# Patient Record
Sex: MALE | Race: BLACK OR AFRICAN AMERICAN | NOT HISPANIC OR LATINO | URBAN - METROPOLITAN AREA
[De-identification: names, ages, dates, MRNs, and addresses within clinical notes are randomized per-mention and may not be internally consistent; named-entity substitution may affect disease eponyms.]

---

## 2017-03-03 ENCOUNTER — OUTPATIENT (OUTPATIENT)
Dept: OUTPATIENT SERVICES | Facility: HOSPITAL | Age: 67
LOS: 1 days | Discharge: HOME | End: 2017-03-03

## 2017-05-01 PROBLEM — Z00.00 ENCOUNTER FOR PREVENTIVE HEALTH EXAMINATION: Status: ACTIVE | Noted: 2017-05-01

## 2017-05-16 ENCOUNTER — APPOINTMENT (OUTPATIENT)
Dept: VASCULAR SURGERY | Facility: CLINIC | Age: 67
End: 2017-05-16

## 2017-06-27 DIAGNOSIS — R05 COUGH: ICD-10-CM

## 2017-09-12 ENCOUNTER — APPOINTMENT (OUTPATIENT)
Dept: VASCULAR SURGERY | Facility: CLINIC | Age: 67
End: 2017-09-12
Payer: MEDICARE

## 2017-09-12 VITALS
DIASTOLIC BLOOD PRESSURE: 92 MMHG | HEIGHT: 70 IN | BODY MASS INDEX: 25.77 KG/M2 | SYSTOLIC BLOOD PRESSURE: 126 MMHG | WEIGHT: 180 LBS

## 2017-09-12 DIAGNOSIS — Z86.39 PERSONAL HISTORY OF OTHER ENDOCRINE, NUTRITIONAL AND METABOLIC DISEASE: ICD-10-CM

## 2017-09-12 DIAGNOSIS — Z86.79 PERSONAL HISTORY OF OTHER DISEASES OF THE CIRCULATORY SYSTEM: ICD-10-CM

## 2017-09-12 DIAGNOSIS — Z86.59 PERSONAL HISTORY OF OTHER MENTAL AND BEHAVIORAL DISORDERS: ICD-10-CM

## 2017-09-12 PROCEDURE — 99212 OFFICE O/P EST SF 10 MIN: CPT

## 2017-11-16 ENCOUNTER — APPOINTMENT (OUTPATIENT)
Dept: UROLOGY | Facility: CLINIC | Age: 67
End: 2017-11-16

## 2017-11-16 ENCOUNTER — OTHER (OUTPATIENT)
Age: 67
End: 2017-11-16

## 2017-11-28 ENCOUNTER — APPOINTMENT (OUTPATIENT)
Dept: VASCULAR SURGERY | Facility: CLINIC | Age: 67
End: 2017-11-28

## 2017-11-28 ENCOUNTER — OUTPATIENT (OUTPATIENT)
Dept: OUTPATIENT SERVICES | Facility: HOSPITAL | Age: 67
LOS: 1 days | Discharge: HOME | End: 2017-11-28

## 2017-11-28 DIAGNOSIS — I71.00 DISSECTION OF UNSPECIFIED SITE OF AORTA: ICD-10-CM

## 2017-12-08 ENCOUNTER — OUTPATIENT (OUTPATIENT)
Dept: OUTPATIENT SERVICES | Facility: HOSPITAL | Age: 67
LOS: 1 days | Discharge: HOME | End: 2017-12-08

## 2017-12-08 DIAGNOSIS — I71.2 THORACIC AORTIC ANEURYSM, WITHOUT RUPTURE: ICD-10-CM

## 2017-12-19 ENCOUNTER — APPOINTMENT (OUTPATIENT)
Dept: VASCULAR SURGERY | Facility: CLINIC | Age: 67
End: 2017-12-19
Payer: MEDICARE

## 2017-12-19 DIAGNOSIS — I71.00 DISSECTION OF UNSPECIFIED SITE OF AORTA: ICD-10-CM

## 2017-12-19 PROCEDURE — 99213 OFFICE O/P EST LOW 20 MIN: CPT

## 2018-01-11 LAB
BUN SERPL-MCNC: 17 MG/DL
CREAT SERPL-MCNC: 1.26 MG/DL
GFR SERPL CREATININE-BSD FRML MDRD: 70

## 2018-04-17 ENCOUNTER — RESULT REVIEW (OUTPATIENT)
Age: 68
End: 2018-04-17

## 2018-04-17 ENCOUNTER — OUTPATIENT (OUTPATIENT)
Dept: OUTPATIENT SERVICES | Facility: HOSPITAL | Age: 68
LOS: 1 days | Discharge: HOME | End: 2018-04-17

## 2018-04-17 VITALS — SYSTOLIC BLOOD PRESSURE: 141 MMHG | RESPIRATION RATE: 18 BRPM | DIASTOLIC BLOOD PRESSURE: 88 MMHG | HEART RATE: 60 BPM

## 2018-04-17 VITALS
DIASTOLIC BLOOD PRESSURE: 100 MMHG | SYSTOLIC BLOOD PRESSURE: 176 MMHG | TEMPERATURE: 98 F | HEART RATE: 66 BPM | WEIGHT: 179.9 LBS | RESPIRATION RATE: 17 BRPM | HEIGHT: 70 IN

## 2018-04-17 RX ORDER — LOSARTAN POTASSIUM 100 MG/1
1 TABLET, FILM COATED ORAL
Qty: 0 | Refills: 0 | COMMUNITY

## 2018-04-17 RX ORDER — NIFEDIPINE 30 MG
1 TABLET, EXTENDED RELEASE 24 HR ORAL
Qty: 0 | Refills: 0 | COMMUNITY

## 2018-04-17 RX ORDER — METOPROLOL TARTRATE 50 MG
1 TABLET ORAL
Qty: 0 | Refills: 0 | COMMUNITY

## 2018-04-17 NOTE — H&P ADULT - NSHPPHYSICALEXAM_GEN_ALL_CORE
GENERAL:  Appears stated age, well-groomed, well-nourished, no distress  HEENT:  NC/AT,  conjunctivae clear and pink,  CHEST:  Full & symmetric excursion, no increased effort, breath sounds clear  HEART:  Regular rhythm, S1, S2, no murmur/rub/S3/S4, no abdominal bruit, no edema  ABDOMEN:  Soft, non-tender, non-distended,   EXTREMITIES:  no cyanosis, clubbing or edema    NEURO:  Alert, oriented, no asterixis, no tremor, no encephalopathy

## 2018-04-17 NOTE — H&P ADULT - ASSESSMENT
68 year old male with history of HTN and thoracic aortic aneurysm s/p repair here for GERD for EGD and screening colonoscopy.

## 2018-04-19 DIAGNOSIS — K29.50 UNSPECIFIED CHRONIC GASTRITIS WITHOUT BLEEDING: ICD-10-CM

## 2018-04-19 DIAGNOSIS — R15.0 INCOMPLETE DEFECATION: ICD-10-CM

## 2018-04-19 DIAGNOSIS — K31.9 DISEASE OF STOMACH AND DUODENUM, UNSPECIFIED: ICD-10-CM

## 2018-04-19 DIAGNOSIS — Z53.09 PROCEDURE AND TREATMENT NOT CARRIED OUT BECAUSE OF OTHER CONTRAINDICATION: ICD-10-CM

## 2018-04-19 DIAGNOSIS — I10 ESSENTIAL (PRIMARY) HYPERTENSION: ICD-10-CM

## 2018-04-19 DIAGNOSIS — K21.9 GASTRO-ESOPHAGEAL REFLUX DISEASE WITHOUT ESOPHAGITIS: ICD-10-CM

## 2018-04-19 DIAGNOSIS — K44.9 DIAPHRAGMATIC HERNIA WITHOUT OBSTRUCTION OR GANGRENE: ICD-10-CM

## 2018-04-19 LAB — SURGICAL PATHOLOGY STUDY: SIGNIFICANT CHANGE UP

## 2019-01-01 ENCOUNTER — APPOINTMENT (OUTPATIENT)
Dept: UROLOGY | Facility: CLINIC | Age: 69
End: 2019-01-01
Payer: MEDICARE

## 2019-01-01 DIAGNOSIS — N52.01 ERECTILE DYSFUNCTION DUE TO ARTERIAL INSUFFICIENCY: ICD-10-CM

## 2019-01-01 DIAGNOSIS — C61 MALIGNANT NEOPLASM OF PROSTATE: ICD-10-CM

## 2019-01-01 PROCEDURE — 99204 OFFICE O/P NEW MOD 45 MIN: CPT

## 2019-09-18 PROBLEM — I10 ESSENTIAL (PRIMARY) HYPERTENSION: Chronic | Status: ACTIVE | Noted: 2018-04-17

## 2019-10-30 PROBLEM — C61 PROSTATE CANCER: Status: ACTIVE | Noted: 2019-01-01

## 2019-10-30 PROBLEM — N52.01 ERECTILE DYSFUNCTION DUE TO ARTERIAL INSUFFICIENCY: Status: ACTIVE | Noted: 2019-01-01

## 2019-11-12 NOTE — HISTORY OF PRESENT ILLNESS
[FreeTextEntry1] : This is a 69 year male who was treated for prostate cancer with radiation in 2010. has been doing well since\par PSA was done two months ago and showed result of 0.2 -- medical records from [facility] reviewed by me\par no leakage of urine, and urination is normal\par states has problems with erections since that time\par sex drive remains normal\par \par He has done research into treatments for erectile dysfuction.  we discussed oral med treatments such as cialis and viagra, penile injections, vacuum device, MUSE, and inflatable penile prosthesis.\par He tells me that he is ready to proceed with implantation of inflatable penile prosthesis. Just needs to clear up medical insurance concerns\par \par

## 2019-11-12 NOTE — PHYSICAL EXAM
[General Appearance - Well Developed] : well developed [Normal Appearance] : normal appearance [General Appearance - Well Nourished] : well nourished [General Appearance - In No Acute Distress] : no acute distress [Well Groomed] : well groomed [Edema] : no peripheral edema [Respiration, Rhythm And Depth] : normal respiratory rhythm and effort [Exaggerated Use Of Accessory Muscles For Inspiration] : no accessory muscle use [Abdomen Soft] : soft [Abdomen Tenderness] : non-tender [Costovertebral Angle Tenderness] : no ~M costovertebral angle tenderness [Normal Station and Gait] : the gait and station were normal for the patient's age [] : no rash [No Focal Deficits] : no focal deficits [Oriented To Time, Place, And Person] : oriented to person, place, and time [Affect] : the affect was normal [Not Anxious] : not anxious [Mood] : the mood was normal

## 2019-11-12 NOTE — ASSESSMENT
[FreeTextEntry1] : This is a 69 year male who was treated for prostate cancer with radiation in 2010. has been doing well since\par PSA was done two months ago and showed result of 0.2 -- medical records from [facility] reviewed by me\par no leakage of urine, and urination is normal\par states has problems with erections since that time\par sex drive remains normal\par \par He has done research into treatments for erectile dysfuction.  we discussed oral med treatments such as cialis and viagra, penile injections, vacuum device, MUSE, and inflatable penile prosthesis.\par He tells me that he is ready to proceed with implantation of inflatable penile prosthesis. Just needs to clear up medical insurance concerns

## 2020-01-01 ENCOUNTER — EMERGENCY (EMERGENCY)
Facility: HOSPITAL | Age: 70
LOS: 0 days | Discharge: HOME | End: 2020-07-29
Attending: EMERGENCY MEDICINE | Admitting: EMERGENCY MEDICINE
Payer: MEDICARE

## 2020-01-01 ENCOUNTER — APPOINTMENT (OUTPATIENT)
Dept: CARDIOTHORACIC SURGERY | Facility: HOSPITAL | Age: 70
End: 2020-01-01

## 2020-01-01 ENCOUNTER — TRANSCRIPTION ENCOUNTER (OUTPATIENT)
Age: 70
End: 2020-01-01

## 2020-01-01 ENCOUNTER — RESULT REVIEW (OUTPATIENT)
Age: 70
End: 2020-01-01

## 2020-01-01 ENCOUNTER — APPOINTMENT (OUTPATIENT)
Dept: CARDIOTHORACIC SURGERY | Facility: CLINIC | Age: 70
End: 2020-01-01
Payer: MEDICARE

## 2020-01-01 ENCOUNTER — INPATIENT (INPATIENT)
Facility: HOSPITAL | Age: 70
LOS: 4 days | DRG: 3 | End: 2020-08-05
Attending: THORACIC SURGERY (CARDIOTHORACIC VASCULAR SURGERY) | Admitting: THORACIC SURGERY (CARDIOTHORACIC VASCULAR SURGERY)
Payer: MEDICARE

## 2020-01-01 VITALS — DIASTOLIC BLOOD PRESSURE: 120 MMHG | HEART RATE: 75 BPM | SYSTOLIC BLOOD PRESSURE: 210 MMHG

## 2020-01-01 VITALS
WEIGHT: 179.9 LBS | HEART RATE: 65 BPM | OXYGEN SATURATION: 98 % | TEMPERATURE: 98 F | RESPIRATION RATE: 19 BRPM | SYSTOLIC BLOOD PRESSURE: 157 MMHG | DIASTOLIC BLOOD PRESSURE: 74 MMHG

## 2020-01-01 VITALS
WEIGHT: 180 LBS | RESPIRATION RATE: 16 BRPM | BODY MASS INDEX: 25.77 KG/M2 | DIASTOLIC BLOOD PRESSURE: 94 MMHG | HEIGHT: 70 IN | OXYGEN SATURATION: 98 % | HEART RATE: 66 BPM | TEMPERATURE: 98.6 F | SYSTOLIC BLOOD PRESSURE: 174 MMHG

## 2020-01-01 VITALS
SYSTOLIC BLOOD PRESSURE: 191 MMHG | HEIGHT: 70 IN | TEMPERATURE: 98 F | WEIGHT: 169.76 LBS | HEART RATE: 67 BPM | DIASTOLIC BLOOD PRESSURE: 100 MMHG | OXYGEN SATURATION: 97 % | RESPIRATION RATE: 18 BRPM

## 2020-01-01 VITALS — WEIGHT: 165.35 LBS | OXYGEN SATURATION: 69 %

## 2020-01-01 DIAGNOSIS — R10.9 UNSPECIFIED ABDOMINAL PAIN: ICD-10-CM

## 2020-01-01 DIAGNOSIS — I71.5: ICD-10-CM

## 2020-01-01 DIAGNOSIS — Z87.891 PERSONAL HISTORY OF NICOTINE DEPENDENCE: ICD-10-CM

## 2020-01-01 DIAGNOSIS — I71.4 ABDOMINAL AORTIC ANEURYSM, WITHOUT RUPTURE: ICD-10-CM

## 2020-01-01 DIAGNOSIS — I71.6 THORACOABDOMINAL AORTIC ANEURYSM, W/OUT RUPTURE: ICD-10-CM

## 2020-01-01 DIAGNOSIS — I71.2 THORACIC AORTIC ANEURYSM, W/OUT RUPTURE: ICD-10-CM

## 2020-01-01 DIAGNOSIS — Z86.79 PERSONAL HISTORY OF OTHER DISEASES OF THE CIRCULATORY SYSTEM: Chronic | ICD-10-CM

## 2020-01-01 DIAGNOSIS — I10 ESSENTIAL (PRIMARY) HYPERTENSION: ICD-10-CM

## 2020-01-01 LAB
A1C WITH ESTIMATED AVERAGE GLUCOSE RESULT: 6 % — HIGH (ref 4–5.6)
ALBUMIN SERPL ELPH-MCNC: 1.3 G/DL — LOW (ref 3.3–5)
ALBUMIN SERPL ELPH-MCNC: 1.5 G/DL — LOW (ref 3.3–5)
ALBUMIN SERPL ELPH-MCNC: 1.6 G/DL — LOW (ref 3.3–5)
ALBUMIN SERPL ELPH-MCNC: 1.7 G/DL — LOW (ref 3.3–5)
ALBUMIN SERPL ELPH-MCNC: 1.8 G/DL — LOW (ref 3.3–5)
ALBUMIN SERPL ELPH-MCNC: 1.9 G/DL — LOW (ref 3.3–5)
ALBUMIN SERPL ELPH-MCNC: 2 G/DL — LOW (ref 3.3–5)
ALBUMIN SERPL ELPH-MCNC: 2.1 G/DL — LOW (ref 3.3–5)
ALBUMIN SERPL ELPH-MCNC: 2.1 G/DL — LOW (ref 3.3–5)
ALBUMIN SERPL ELPH-MCNC: 2.2 G/DL — LOW (ref 3.3–5)
ALBUMIN SERPL ELPH-MCNC: 2.2 G/DL — LOW (ref 3.3–5)
ALBUMIN SERPL ELPH-MCNC: 2.3 G/DL — LOW (ref 3.3–5)
ALBUMIN SERPL ELPH-MCNC: 2.8 G/DL — LOW (ref 3.3–5)
ALBUMIN SERPL ELPH-MCNC: 2.8 G/DL — LOW (ref 3.3–5)
ALBUMIN SERPL ELPH-MCNC: 3.3 G/DL — SIGNIFICANT CHANGE UP (ref 3.3–5)
ALBUMIN SERPL ELPH-MCNC: 3.4 G/DL — SIGNIFICANT CHANGE UP (ref 3.3–5)
ALBUMIN SERPL ELPH-MCNC: 3.7 G/DL — SIGNIFICANT CHANGE UP (ref 3.3–5)
ALBUMIN SERPL ELPH-MCNC: 4.5 G/DL — SIGNIFICANT CHANGE UP (ref 3.3–5)
ALBUMIN SERPL ELPH-MCNC: 4.9 G/DL — SIGNIFICANT CHANGE UP (ref 3.5–5.2)
ALP SERPL-CCNC: 114 U/L — SIGNIFICANT CHANGE UP (ref 40–120)
ALP SERPL-CCNC: 136 U/L — HIGH (ref 30–115)
ALP SERPL-CCNC: 29 U/L — LOW (ref 40–120)
ALP SERPL-CCNC: 29 U/L — LOW (ref 40–120)
ALP SERPL-CCNC: 44 U/L — SIGNIFICANT CHANGE UP (ref 40–120)
ALP SERPL-CCNC: 49 U/L — SIGNIFICANT CHANGE UP (ref 40–120)
ALP SERPL-CCNC: 50 U/L — SIGNIFICANT CHANGE UP (ref 40–120)
ALP SERPL-CCNC: 52 U/L — SIGNIFICANT CHANGE UP (ref 40–120)
ALP SERPL-CCNC: 53 U/L — SIGNIFICANT CHANGE UP (ref 40–120)
ALP SERPL-CCNC: 57 U/L — SIGNIFICANT CHANGE UP (ref 40–120)
ALP SERPL-CCNC: 59 U/L — SIGNIFICANT CHANGE UP (ref 40–120)
ALP SERPL-CCNC: 60 U/L — SIGNIFICANT CHANGE UP (ref 40–120)
ALP SERPL-CCNC: 61 U/L — SIGNIFICANT CHANGE UP (ref 40–120)
ALP SERPL-CCNC: 62 U/L — SIGNIFICANT CHANGE UP (ref 40–120)
ALP SERPL-CCNC: 68 U/L — SIGNIFICANT CHANGE UP (ref 40–120)
ALP SERPL-CCNC: 72 U/L — SIGNIFICANT CHANGE UP (ref 40–120)
ALP SERPL-CCNC: 82 U/L — SIGNIFICANT CHANGE UP (ref 40–120)
ALP SERPL-CCNC: 89 U/L — SIGNIFICANT CHANGE UP (ref 40–120)
ALP SERPL-CCNC: 89 U/L — SIGNIFICANT CHANGE UP (ref 40–120)
ALT FLD-CCNC: 10 U/L — SIGNIFICANT CHANGE UP (ref 10–45)
ALT FLD-CCNC: 12 U/L — SIGNIFICANT CHANGE UP (ref 0–41)
ALT FLD-CCNC: 201 U/L — HIGH (ref 10–45)
ALT FLD-CCNC: 202 U/L — HIGH (ref 10–45)
ALT FLD-CCNC: 213 U/L — HIGH (ref 10–45)
ALT FLD-CCNC: 282 U/L — HIGH (ref 10–45)
ALT FLD-CCNC: 349 U/L — HIGH (ref 10–45)
ALT FLD-CCNC: 430 U/L — HIGH (ref 10–45)
ALT FLD-CCNC: 528 U/L — HIGH (ref 10–45)
ALT FLD-CCNC: 551 U/L — HIGH (ref 10–45)
ALT FLD-CCNC: 564 U/L — HIGH (ref 10–45)
ALT FLD-CCNC: 584 U/L — HIGH (ref 10–45)
ALT FLD-CCNC: 7 U/L — LOW (ref 10–45)
ALT FLD-CCNC: 7 U/L — LOW (ref 10–45)
ALT FLD-CCNC: 752 U/L — HIGH (ref 10–45)
ALT FLD-CCNC: 775 U/L — HIGH (ref 10–45)
ALT FLD-CCNC: 8 U/L — LOW (ref 10–45)
ALT FLD-CCNC: 897 U/L — HIGH (ref 10–45)
ALT FLD-CCNC: SIGNIFICANT CHANGE UP U/L (ref 10–45)
ANION GAP SERPL CALC-SCNC: 10 MMOL/L — SIGNIFICANT CHANGE UP (ref 5–17)
ANION GAP SERPL CALC-SCNC: 11 MMOL/L — SIGNIFICANT CHANGE UP (ref 5–17)
ANION GAP SERPL CALC-SCNC: 13 MMOL/L — SIGNIFICANT CHANGE UP (ref 5–17)
ANION GAP SERPL CALC-SCNC: 13 MMOL/L — SIGNIFICANT CHANGE UP (ref 5–17)
ANION GAP SERPL CALC-SCNC: 14 MMOL/L — SIGNIFICANT CHANGE UP (ref 5–17)
ANION GAP SERPL CALC-SCNC: 17 MMOL/L — SIGNIFICANT CHANGE UP (ref 5–17)
ANION GAP SERPL CALC-SCNC: 18 MMOL/L — HIGH (ref 5–17)
ANION GAP SERPL CALC-SCNC: 18 MMOL/L — HIGH (ref 7–14)
ANION GAP SERPL CALC-SCNC: 19 MMOL/L — HIGH (ref 5–17)
ANION GAP SERPL CALC-SCNC: 20 MMOL/L — HIGH (ref 5–17)
ANION GAP SERPL CALC-SCNC: 20 MMOL/L — HIGH (ref 5–17)
ANION GAP SERPL CALC-SCNC: 21 MMOL/L — HIGH (ref 5–17)
ANION GAP SERPL CALC-SCNC: 22 MMOL/L — HIGH (ref 5–17)
ANION GAP SERPL CALC-SCNC: 23 MMOL/L — HIGH (ref 5–17)
ANION GAP SERPL CALC-SCNC: 24 MMOL/L — HIGH (ref 5–17)
ANION GAP SERPL CALC-SCNC: 24 MMOL/L — HIGH (ref 5–17)
ANION GAP SERPL CALC-SCNC: 25 MMOL/L — HIGH (ref 5–17)
ANION GAP SERPL CALC-SCNC: 8 MMOL/L — SIGNIFICANT CHANGE UP (ref 5–17)
ANION GAP SERPL CALC-SCNC: 9 MMOL/L — SIGNIFICANT CHANGE UP (ref 5–17)
APTT BLD: 196.3 SEC — CRITICAL HIGH (ref 27.5–35.5)
APTT BLD: 30.5 SEC — SIGNIFICANT CHANGE UP (ref 27.5–35.5)
APTT BLD: 30.8 SEC — SIGNIFICANT CHANGE UP (ref 27.5–35.5)
APTT BLD: 31.2 SEC — SIGNIFICANT CHANGE UP (ref 27.5–35.5)
APTT BLD: 32.1 SEC — SIGNIFICANT CHANGE UP (ref 27.5–35.5)
APTT BLD: 32.2 SEC — SIGNIFICANT CHANGE UP (ref 27.5–35.5)
APTT BLD: 33.2 SEC — SIGNIFICANT CHANGE UP (ref 27.5–35.5)
APTT BLD: 34.9 SEC — SIGNIFICANT CHANGE UP (ref 27.5–35.5)
APTT BLD: 34.9 SEC — SIGNIFICANT CHANGE UP (ref 27.5–35.5)
APTT BLD: 35.2 SEC — SIGNIFICANT CHANGE UP (ref 27.5–35.5)
APTT BLD: 36 SEC — HIGH (ref 27.5–35.5)
APTT BLD: 36.6 SEC — HIGH (ref 27.5–35.5)
APTT BLD: 37.6 SEC — HIGH (ref 27.5–35.5)
APTT BLD: 38.7 SEC — HIGH (ref 27.5–35.5)
APTT BLD: 39.8 SEC — HIGH (ref 27–39.2)
APTT BLD: 40.6 SEC — HIGH (ref 27.5–35.5)
APTT BLD: 41.3 SEC — HIGH (ref 27.5–35.5)
APTT BLD: 41.5 SEC — HIGH (ref 27.5–35.5)
APTT BLD: 43.6 SEC — HIGH (ref 27.5–35.5)
APTT BLD: 49.3 SEC — HIGH (ref 27.5–35.5)
APTT BLD: 54.7 SEC — HIGH (ref 27.5–35.5)
APTT BLD: 77.7 SEC — HIGH (ref 27.5–35.5)
APTT BLD: 84.4 SEC — HIGH (ref 27.5–35.5)
AST SERPL-CCNC: 10 U/L — SIGNIFICANT CHANGE UP (ref 10–40)
AST SERPL-CCNC: 10 U/L — SIGNIFICANT CHANGE UP (ref 10–40)
AST SERPL-CCNC: 1029 U/L — HIGH (ref 10–40)
AST SERPL-CCNC: 1044 U/L — HIGH (ref 10–40)
AST SERPL-CCNC: 1147 U/L — HIGH (ref 10–40)
AST SERPL-CCNC: 12 U/L — SIGNIFICANT CHANGE UP (ref 10–40)
AST SERPL-CCNC: 1217 U/L — HIGH (ref 10–40)
AST SERPL-CCNC: 1318 U/L — HIGH (ref 10–40)
AST SERPL-CCNC: 14 U/L — SIGNIFICANT CHANGE UP (ref 10–40)
AST SERPL-CCNC: 1470 U/L — HIGH (ref 10–40)
AST SERPL-CCNC: 1476 U/L — HIGH (ref 10–40)
AST SERPL-CCNC: 1698 U/L — HIGH (ref 10–40)
AST SERPL-CCNC: 17 U/L — SIGNIFICANT CHANGE UP (ref 0–41)
AST SERPL-CCNC: 1771 U/L — HIGH (ref 10–40)
AST SERPL-CCNC: 1973 U/L — HIGH (ref 10–40)
AST SERPL-CCNC: 2105 U/L — HIGH (ref 10–40)
AST SERPL-CCNC: 521 U/L — HIGH (ref 10–40)
AST SERPL-CCNC: 734 U/L — HIGH (ref 10–40)
AST SERPL-CCNC: SIGNIFICANT CHANGE UP U/L (ref 10–40)
BASE EXCESS BLDA CALC-SCNC: 0.2 MMOL/L — SIGNIFICANT CHANGE UP (ref -2–3)
BASE EXCESS BLDMV CALC-SCNC: -4.7 MMOL/L — SIGNIFICANT CHANGE UP
BASOPHILS # BLD AUTO: 0 K/UL — SIGNIFICANT CHANGE UP (ref 0–0.2)
BASOPHILS # BLD AUTO: 0.01 K/UL — SIGNIFICANT CHANGE UP (ref 0–0.2)
BASOPHILS # BLD AUTO: 0.01 K/UL — SIGNIFICANT CHANGE UP (ref 0–0.2)
BASOPHILS # BLD AUTO: 0.02 K/UL — SIGNIFICANT CHANGE UP (ref 0–0.2)
BASOPHILS # BLD AUTO: 0.02 K/UL — SIGNIFICANT CHANGE UP (ref 0–0.2)
BASOPHILS # BLD AUTO: 0.03 K/UL — SIGNIFICANT CHANGE UP (ref 0–0.2)
BASOPHILS NFR BLD AUTO: 0 % — SIGNIFICANT CHANGE UP (ref 0–2)
BASOPHILS NFR BLD AUTO: 0.2 % — SIGNIFICANT CHANGE UP (ref 0–1)
BASOPHILS NFR BLD AUTO: 0.3 % — SIGNIFICANT CHANGE UP (ref 0–2)
BASOPHILS NFR BLD AUTO: 0.3 % — SIGNIFICANT CHANGE UP (ref 0–2)
BASOPHILS NFR BLD AUTO: 0.5 % — SIGNIFICANT CHANGE UP (ref 0–2)
BASOPHILS NFR BLD AUTO: 0.6 % — SIGNIFICANT CHANGE UP (ref 0–2)
BILIRUB SERPL-MCNC: 0.5 MG/DL — SIGNIFICANT CHANGE UP (ref 0.2–1.2)
BILIRUB SERPL-MCNC: 0.8 MG/DL — SIGNIFICANT CHANGE UP (ref 0.2–1.2)
BILIRUB SERPL-MCNC: 1 MG/DL — SIGNIFICANT CHANGE UP (ref 0.2–1.2)
BILIRUB SERPL-MCNC: 1 MG/DL — SIGNIFICANT CHANGE UP (ref 0.2–1.2)
BILIRUB SERPL-MCNC: 1.1 MG/DL — SIGNIFICANT CHANGE UP (ref 0.2–1.2)
BILIRUB SERPL-MCNC: 1.2 MG/DL — SIGNIFICANT CHANGE UP (ref 0.2–1.2)
BILIRUB SERPL-MCNC: 1.3 MG/DL — HIGH (ref 0.2–1.2)
BILIRUB SERPL-MCNC: 1.4 MG/DL — HIGH (ref 0.2–1.2)
BILIRUB SERPL-MCNC: 1.5 MG/DL — HIGH (ref 0.2–1.2)
BILIRUB SERPL-MCNC: 1.8 MG/DL — HIGH (ref 0.2–1.2)
BILIRUB SERPL-MCNC: 1.9 MG/DL — HIGH (ref 0.2–1.2)
BILIRUB SERPL-MCNC: 2 MG/DL — HIGH (ref 0.2–1.2)
BILIRUB SERPL-MCNC: 2.2 MG/DL — HIGH (ref 0.2–1.2)
BILIRUB SERPL-MCNC: 2.3 MG/DL — HIGH (ref 0.2–1.2)
BILIRUB SERPL-MCNC: 2.6 MG/DL — HIGH (ref 0.2–1.2)
BLD GP AB SCN SERPL QL: NEGATIVE — SIGNIFICANT CHANGE UP
BLD GP AB SCN SERPL QL: SIGNIFICANT CHANGE UP
BUN SERPL-MCNC: 10 MG/DL — SIGNIFICANT CHANGE UP (ref 10–20)
BUN SERPL-MCNC: 13 MG/DL — SIGNIFICANT CHANGE UP (ref 7–23)
BUN SERPL-MCNC: 15 MG/DL — SIGNIFICANT CHANGE UP (ref 7–23)
BUN SERPL-MCNC: 16 MG/DL — SIGNIFICANT CHANGE UP (ref 7–23)
BUN SERPL-MCNC: 16 MG/DL — SIGNIFICANT CHANGE UP (ref 7–23)
BUN SERPL-MCNC: 17 MG/DL — SIGNIFICANT CHANGE UP (ref 7–23)
BUN SERPL-MCNC: 18 MG/DL — SIGNIFICANT CHANGE UP (ref 7–23)
BUN SERPL-MCNC: 23 MG/DL — SIGNIFICANT CHANGE UP (ref 7–23)
BUN SERPL-MCNC: 24 MG/DL — HIGH (ref 7–23)
BUN SERPL-MCNC: 24 MG/DL — HIGH (ref 7–23)
BUN SERPL-MCNC: 30 MG/DL — HIGH (ref 7–23)
CALCIUM SERPL-MCNC: 10 MG/DL — SIGNIFICANT CHANGE UP (ref 8.4–10.5)
CALCIUM SERPL-MCNC: 10.1 MG/DL — SIGNIFICANT CHANGE UP (ref 8.5–10.1)
CALCIUM SERPL-MCNC: 10.2 MG/DL — SIGNIFICANT CHANGE UP (ref 8.4–10.5)
CALCIUM SERPL-MCNC: 11.4 MG/DL — HIGH (ref 8.4–10.5)
CALCIUM SERPL-MCNC: 12.3 MG/DL — HIGH (ref 8.4–10.5)
CALCIUM SERPL-MCNC: 7.6 MG/DL — LOW (ref 8.4–10.5)
CALCIUM SERPL-MCNC: 8.6 MG/DL — SIGNIFICANT CHANGE UP (ref 8.4–10.5)
CALCIUM SERPL-MCNC: 8.7 MG/DL — SIGNIFICANT CHANGE UP (ref 8.4–10.5)
CALCIUM SERPL-MCNC: 8.8 MG/DL — SIGNIFICANT CHANGE UP (ref 8.4–10.5)
CALCIUM SERPL-MCNC: 8.8 MG/DL — SIGNIFICANT CHANGE UP (ref 8.4–10.5)
CALCIUM SERPL-MCNC: 8.9 MG/DL — SIGNIFICANT CHANGE UP (ref 8.4–10.5)
CALCIUM SERPL-MCNC: 9 MG/DL — SIGNIFICANT CHANGE UP (ref 8.4–10.5)
CALCIUM SERPL-MCNC: 9 MG/DL — SIGNIFICANT CHANGE UP (ref 8.4–10.5)
CALCIUM SERPL-MCNC: 9.1 MG/DL — SIGNIFICANT CHANGE UP (ref 8.4–10.5)
CALCIUM SERPL-MCNC: 9.2 MG/DL — SIGNIFICANT CHANGE UP (ref 8.4–10.5)
CALCIUM SERPL-MCNC: 9.2 MG/DL — SIGNIFICANT CHANGE UP (ref 8.4–10.5)
CALCIUM SERPL-MCNC: 9.3 MG/DL — SIGNIFICANT CHANGE UP (ref 8.4–10.5)
CALCIUM SERPL-MCNC: 9.3 MG/DL — SIGNIFICANT CHANGE UP (ref 8.4–10.5)
CALCIUM SERPL-MCNC: 9.7 MG/DL — SIGNIFICANT CHANGE UP (ref 8.4–10.5)
CHLORIDE SERPL-SCNC: 103 MMOL/L — SIGNIFICANT CHANGE UP (ref 96–108)
CHLORIDE SERPL-SCNC: 107 MMOL/L — SIGNIFICANT CHANGE UP (ref 96–108)
CHLORIDE SERPL-SCNC: 108 MMOL/L — SIGNIFICANT CHANGE UP (ref 96–108)
CHLORIDE SERPL-SCNC: 109 MMOL/L — HIGH (ref 96–108)
CHLORIDE SERPL-SCNC: 110 MMOL/L — HIGH (ref 96–108)
CHLORIDE SERPL-SCNC: 111 MMOL/L — HIGH (ref 96–108)
CHLORIDE SERPL-SCNC: 94 MMOL/L — LOW (ref 96–108)
CHLORIDE SERPL-SCNC: 94 MMOL/L — LOW (ref 98–110)
CHLORIDE SERPL-SCNC: 95 MMOL/L — LOW (ref 96–108)
CHLORIDE SERPL-SCNC: 98 MMOL/L — SIGNIFICANT CHANGE UP (ref 96–108)
CK MB CFR SERPL CALC: 25.6 NG/ML — HIGH (ref 0–6.7)
CK SERPL-CCNC: 1288 U/L — HIGH (ref 30–200)
CO2 SERPL-SCNC: 14 MMOL/L — LOW (ref 22–31)
CO2 SERPL-SCNC: 16 MMOL/L — LOW (ref 22–31)
CO2 SERPL-SCNC: 17 MMOL/L — LOW (ref 22–31)
CO2 SERPL-SCNC: 18 MMOL/L — LOW (ref 22–31)
CO2 SERPL-SCNC: 20 MMOL/L — LOW (ref 22–31)
CO2 SERPL-SCNC: 20 MMOL/L — LOW (ref 22–31)
CO2 SERPL-SCNC: 22 MMOL/L — SIGNIFICANT CHANGE UP (ref 22–31)
CO2 SERPL-SCNC: 23 MMOL/L — SIGNIFICANT CHANGE UP (ref 22–31)
CO2 SERPL-SCNC: 23 MMOL/L — SIGNIFICANT CHANGE UP (ref 22–31)
CO2 SERPL-SCNC: 26 MMOL/L — SIGNIFICANT CHANGE UP (ref 17–32)
CO2 SERPL-SCNC: 26 MMOL/L — SIGNIFICANT CHANGE UP (ref 22–31)
CO2 SERPL-SCNC: 27 MMOL/L — SIGNIFICANT CHANGE UP (ref 22–31)
CO2 SERPL-SCNC: 35 MMOL/L — HIGH (ref 22–31)
CORTIS AM PEAK SERPL-MCNC: 8 UG/DL — SIGNIFICANT CHANGE UP (ref 3.9–37.5)
CREAT SERPL-MCNC: 1.1 MG/DL — SIGNIFICANT CHANGE UP (ref 0.5–1.3)
CREAT SERPL-MCNC: 1.18 MG/DL — SIGNIFICANT CHANGE UP (ref 0.5–1.3)
CREAT SERPL-MCNC: 1.19 MG/DL — SIGNIFICANT CHANGE UP (ref 0.5–1.3)
CREAT SERPL-MCNC: 1.2 MG/DL — SIGNIFICANT CHANGE UP (ref 0.7–1.5)
CREAT SERPL-MCNC: 1.25 MG/DL — SIGNIFICANT CHANGE UP (ref 0.5–1.3)
CREAT SERPL-MCNC: 1.26 MG/DL — SIGNIFICANT CHANGE UP (ref 0.5–1.3)
CREAT SERPL-MCNC: 1.29 MG/DL — SIGNIFICANT CHANGE UP (ref 0.5–1.3)
CREAT SERPL-MCNC: 1.31 MG/DL — HIGH (ref 0.5–1.3)
CREAT SERPL-MCNC: 1.33 MG/DL — HIGH (ref 0.5–1.3)
CREAT SERPL-MCNC: 1.36 MG/DL — HIGH (ref 0.5–1.3)
CREAT SERPL-MCNC: 1.36 MG/DL — HIGH (ref 0.5–1.3)
CREAT SERPL-MCNC: 1.37 MG/DL — HIGH (ref 0.5–1.3)
CREAT SERPL-MCNC: 1.46 MG/DL — HIGH (ref 0.5–1.3)
CREAT SERPL-MCNC: 1.46 MG/DL — HIGH (ref 0.5–1.3)
CREAT SERPL-MCNC: 1.47 MG/DL — HIGH (ref 0.5–1.3)
CREAT SERPL-MCNC: 1.48 MG/DL — HIGH (ref 0.5–1.3)
CREAT SERPL-MCNC: 1.49 MG/DL — HIGH (ref 0.5–1.3)
CREAT SERPL-MCNC: 1.56 MG/DL — HIGH (ref 0.5–1.3)
CREAT SERPL-MCNC: 1.69 MG/DL — HIGH (ref 0.5–1.3)
CREAT SERPL-MCNC: 1.77 MG/DL — HIGH (ref 0.5–1.3)
CREAT SERPL-MCNC: 1.87 MG/DL — HIGH (ref 0.5–1.3)
CREAT SERPL-MCNC: 1.91 MG/DL — HIGH (ref 0.5–1.3)
D DIMER BLD IA.RAPID-MCNC: HIGH NG/ML DDU
EOSINOPHIL # BLD AUTO: 0.01 K/UL — SIGNIFICANT CHANGE UP (ref 0–0.5)
EOSINOPHIL # BLD AUTO: 0.01 K/UL — SIGNIFICANT CHANGE UP (ref 0–0.7)
EOSINOPHIL # BLD AUTO: 0.02 K/UL — SIGNIFICANT CHANGE UP (ref 0–0.5)
EOSINOPHIL # BLD AUTO: 0.09 K/UL — SIGNIFICANT CHANGE UP (ref 0–0.5)
EOSINOPHIL NFR BLD AUTO: 0.1 % — SIGNIFICANT CHANGE UP (ref 0–6)
EOSINOPHIL NFR BLD AUTO: 0.1 % — SIGNIFICANT CHANGE UP (ref 0–8)
EOSINOPHIL NFR BLD AUTO: 0.2 % — SIGNIFICANT CHANGE UP (ref 0–6)
EOSINOPHIL NFR BLD AUTO: 0.5 % — SIGNIFICANT CHANGE UP (ref 0–6)
EOSINOPHIL NFR BLD AUTO: 0.8 % — SIGNIFICANT CHANGE UP (ref 0–6)
EOSINOPHIL NFR BLD AUTO: 1.3 % — SIGNIFICANT CHANGE UP (ref 0–6)
ESTIMATED AVERAGE GLUCOSE: 126 MG/DL — HIGH (ref 68–114)
FIBRINOGEN PPP-MCNC: 110 MG/DL — LOW (ref 258–438)
FIBRINOGEN PPP-MCNC: 186 MG/DL — LOW (ref 258–438)
FIBRINOGEN PPP-MCNC: 265 MG/DL — SIGNIFICANT CHANGE UP (ref 258–438)
GAS PNL BLDA: SIGNIFICANT CHANGE UP
GAS PNL BLDMV: SIGNIFICANT CHANGE UP
GAS PNL BLDV: SIGNIFICANT CHANGE UP
GLUCOSE BLDC GLUCOMTR-MCNC: 103 MG/DL — HIGH (ref 70–99)
GLUCOSE BLDC GLUCOMTR-MCNC: 107 MG/DL — HIGH (ref 70–99)
GLUCOSE BLDC GLUCOMTR-MCNC: 108 MG/DL — HIGH (ref 70–99)
GLUCOSE BLDC GLUCOMTR-MCNC: 111 MG/DL — HIGH (ref 70–99)
GLUCOSE BLDC GLUCOMTR-MCNC: 114 MG/DL — HIGH (ref 70–99)
GLUCOSE BLDC GLUCOMTR-MCNC: 118 MG/DL — HIGH (ref 70–99)
GLUCOSE BLDC GLUCOMTR-MCNC: 119 MG/DL — HIGH (ref 70–99)
GLUCOSE BLDC GLUCOMTR-MCNC: 119 MG/DL — HIGH (ref 70–99)
GLUCOSE BLDC GLUCOMTR-MCNC: 125 MG/DL — HIGH (ref 70–99)
GLUCOSE BLDC GLUCOMTR-MCNC: 131 MG/DL — HIGH (ref 70–99)
GLUCOSE BLDC GLUCOMTR-MCNC: 133 MG/DL — HIGH (ref 70–99)
GLUCOSE BLDC GLUCOMTR-MCNC: 135 MG/DL — HIGH (ref 70–99)
GLUCOSE BLDC GLUCOMTR-MCNC: 142 MG/DL — HIGH (ref 70–99)
GLUCOSE BLDC GLUCOMTR-MCNC: 143 MG/DL — HIGH (ref 70–99)
GLUCOSE BLDC GLUCOMTR-MCNC: 149 MG/DL — HIGH (ref 70–99)
GLUCOSE BLDC GLUCOMTR-MCNC: 157 MG/DL — HIGH (ref 70–99)
GLUCOSE BLDC GLUCOMTR-MCNC: 171 MG/DL — HIGH (ref 70–99)
GLUCOSE BLDC GLUCOMTR-MCNC: 180 MG/DL — HIGH (ref 70–99)
GLUCOSE BLDC GLUCOMTR-MCNC: 202 MG/DL — HIGH (ref 70–99)
GLUCOSE BLDC GLUCOMTR-MCNC: 224 MG/DL — HIGH (ref 70–99)
GLUCOSE BLDC GLUCOMTR-MCNC: 47 MG/DL — LOW (ref 70–99)
GLUCOSE BLDC GLUCOMTR-MCNC: 50 MG/DL — LOW (ref 70–99)
GLUCOSE BLDC GLUCOMTR-MCNC: 71 MG/DL — SIGNIFICANT CHANGE UP (ref 70–99)
GLUCOSE BLDC GLUCOMTR-MCNC: 77 MG/DL — SIGNIFICANT CHANGE UP (ref 70–99)
GLUCOSE BLDC GLUCOMTR-MCNC: 97 MG/DL — SIGNIFICANT CHANGE UP (ref 70–99)
GLUCOSE SERPL-MCNC: 100 MG/DL — HIGH (ref 70–99)
GLUCOSE SERPL-MCNC: 106 MG/DL — HIGH (ref 70–99)
GLUCOSE SERPL-MCNC: 113 MG/DL — HIGH (ref 70–99)
GLUCOSE SERPL-MCNC: 114 MG/DL — HIGH (ref 70–99)
GLUCOSE SERPL-MCNC: 120 MG/DL — HIGH (ref 70–99)
GLUCOSE SERPL-MCNC: 121 MG/DL — HIGH (ref 70–99)
GLUCOSE SERPL-MCNC: 126 MG/DL — HIGH (ref 70–99)
GLUCOSE SERPL-MCNC: 127 MG/DL — HIGH (ref 70–99)
GLUCOSE SERPL-MCNC: 129 MG/DL — HIGH (ref 70–99)
GLUCOSE SERPL-MCNC: 133 MG/DL — HIGH (ref 70–99)
GLUCOSE SERPL-MCNC: 140 MG/DL — HIGH (ref 70–99)
GLUCOSE SERPL-MCNC: 141 MG/DL — HIGH (ref 70–99)
GLUCOSE SERPL-MCNC: 148 MG/DL — HIGH (ref 70–99)
GLUCOSE SERPL-MCNC: 153 MG/DL — HIGH (ref 70–99)
GLUCOSE SERPL-MCNC: 157 MG/DL — HIGH (ref 70–99)
GLUCOSE SERPL-MCNC: 161 MG/DL — HIGH (ref 70–99)
GLUCOSE SERPL-MCNC: 172 MG/DL — HIGH (ref 70–99)
GLUCOSE SERPL-MCNC: 175 MG/DL — HIGH (ref 70–99)
GLUCOSE SERPL-MCNC: 195 MG/DL — HIGH (ref 70–99)
GLUCOSE SERPL-MCNC: 199 MG/DL — HIGH (ref 70–99)
GLUCOSE SERPL-MCNC: 207 MG/DL — HIGH (ref 70–99)
GLUCOSE SERPL-MCNC: 97 MG/DL — SIGNIFICANT CHANGE UP (ref 70–99)
HCO3 BLDA-SCNC: 24 MMOL/L — SIGNIFICANT CHANGE UP (ref 21–28)
HCO3 BLDMV-SCNC: 21 MMOL/L — SIGNIFICANT CHANGE UP
HCT VFR BLD CALC: 24.7 % — LOW (ref 39–50)
HCT VFR BLD CALC: 25.6 % — LOW (ref 39–50)
HCT VFR BLD CALC: 25.9 % — LOW (ref 39–50)
HCT VFR BLD CALC: 26.7 % — LOW (ref 39–50)
HCT VFR BLD CALC: 27.1 % — LOW (ref 39–50)
HCT VFR BLD CALC: 28.9 % — LOW (ref 39–50)
HCT VFR BLD CALC: 29.7 % — LOW (ref 39–50)
HCT VFR BLD CALC: 30.2 % — LOW (ref 39–50)
HCT VFR BLD CALC: 31.5 % — LOW (ref 39–50)
HCT VFR BLD CALC: 31.6 % — LOW (ref 39–50)
HCT VFR BLD CALC: 32.1 % — LOW (ref 39–50)
HCT VFR BLD CALC: 33.2 % — LOW (ref 39–50)
HCT VFR BLD CALC: 33.2 % — LOW (ref 39–50)
HCT VFR BLD CALC: 34.2 % — LOW (ref 39–50)
HCT VFR BLD CALC: 35 % — LOW (ref 39–50)
HCT VFR BLD CALC: 35.4 % — LOW (ref 39–50)
HCT VFR BLD CALC: 36.3 % — LOW (ref 39–50)
HCT VFR BLD CALC: 37.5 % — LOW (ref 39–50)
HCT VFR BLD CALC: 43.6 % — SIGNIFICANT CHANGE UP (ref 39–50)
HCT VFR BLD CALC: 45.2 % — SIGNIFICANT CHANGE UP (ref 42–52)
HCV AB S/CO SERPL IA: 0.05 S/CO — SIGNIFICANT CHANGE UP
HCV AB SERPL-IMP: SIGNIFICANT CHANGE UP
HGB BLD-MCNC: 10 G/DL — LOW (ref 13–17)
HGB BLD-MCNC: 10.1 G/DL — LOW (ref 13–17)
HGB BLD-MCNC: 10.5 G/DL — LOW (ref 13–17)
HGB BLD-MCNC: 10.6 G/DL — LOW (ref 13–17)
HGB BLD-MCNC: 10.7 G/DL — LOW (ref 13–17)
HGB BLD-MCNC: 10.7 G/DL — LOW (ref 13–17)
HGB BLD-MCNC: 10.9 G/DL — LOW (ref 13–17)
HGB BLD-MCNC: 11.1 G/DL — LOW (ref 13–17)
HGB BLD-MCNC: 11.2 G/DL — LOW (ref 13–17)
HGB BLD-MCNC: 11.6 G/DL — LOW (ref 13–17)
HGB BLD-MCNC: 11.8 G/DL — LOW (ref 13–17)
HGB BLD-MCNC: 12 G/DL — LOW (ref 13–17)
HGB BLD-MCNC: 12.2 G/DL — LOW (ref 13–17)
HGB BLD-MCNC: 12.3 G/DL — LOW (ref 13–17)
HGB BLD-MCNC: 14.3 G/DL — SIGNIFICANT CHANGE UP (ref 13–17)
HGB BLD-MCNC: 15.2 G/DL — SIGNIFICANT CHANGE UP (ref 14–18)
HGB BLD-MCNC: 8.5 G/DL — LOW (ref 13–17)
HGB BLD-MCNC: 8.6 G/DL — LOW (ref 13–17)
HGB BLD-MCNC: 8.9 G/DL — LOW (ref 13–17)
HGB BLD-MCNC: 9.1 G/DL — LOW (ref 13–17)
HGB BLD-MCNC: 9.2 G/DL — LOW (ref 13–17)
HGB BLD-MCNC: 9.9 G/DL — LOW (ref 13–17)
HIV 1+2 AB+HIV1 P24 AG SERPL QL IA: SIGNIFICANT CHANGE UP
IMM GRANULOCYTES NFR BLD AUTO: 0.3 % — SIGNIFICANT CHANGE UP (ref 0.1–0.3)
IMM GRANULOCYTES NFR BLD AUTO: 0.3 % — SIGNIFICANT CHANGE UP (ref 0–1.5)
IMM GRANULOCYTES NFR BLD AUTO: 1 % — SIGNIFICANT CHANGE UP (ref 0–1.5)
IMM GRANULOCYTES NFR BLD AUTO: 1.3 % — SIGNIFICANT CHANGE UP (ref 0–1.5)
IMM GRANULOCYTES NFR BLD AUTO: 1.3 % — SIGNIFICANT CHANGE UP (ref 0–1.5)
IMM GRANULOCYTES NFR BLD AUTO: 3.7 % — HIGH (ref 0–1.5)
INR BLD: 0.97 — SIGNIFICANT CHANGE UP (ref 0.88–1.16)
INR BLD: 1 — SIGNIFICANT CHANGE UP (ref 0.88–1.16)
INR BLD: 1.04 — SIGNIFICANT CHANGE UP (ref 0.88–1.16)
INR BLD: 1.08 — SIGNIFICANT CHANGE UP (ref 0.88–1.16)
INR BLD: 1.1 RATIO — SIGNIFICANT CHANGE UP (ref 0.65–1.3)
INR BLD: 1.11 — SIGNIFICANT CHANGE UP (ref 0.88–1.16)
INR BLD: 1.11 — SIGNIFICANT CHANGE UP (ref 0.88–1.16)
INR BLD: 1.14 — SIGNIFICANT CHANGE UP (ref 0.88–1.16)
INR BLD: 1.19 — HIGH (ref 0.88–1.16)
INR BLD: 1.19 — HIGH (ref 0.88–1.16)
INR BLD: 1.22 — HIGH (ref 0.88–1.16)
INR BLD: 1.23 — HIGH (ref 0.88–1.16)
INR BLD: 1.25 — HIGH (ref 0.88–1.16)
INR BLD: 1.26 — HIGH (ref 0.88–1.16)
INR BLD: 1.38 — HIGH (ref 0.88–1.16)
INR BLD: 1.47 — HIGH (ref 0.88–1.16)
INR BLD: 1.56 — HIGH (ref 0.88–1.16)
INR BLD: 1.59 — HIGH (ref 0.88–1.16)
INR BLD: 1.67 — HIGH (ref 0.88–1.16)
INR BLD: 1.68 — HIGH (ref 0.88–1.16)
INR BLD: 1.79 — HIGH (ref 0.88–1.16)
INR BLD: 2.08 — HIGH (ref 0.88–1.16)
LACTATE SERPL-SCNC: 0.7 MMOL/L — SIGNIFICANT CHANGE UP (ref 0.5–2)
LACTATE SERPL-SCNC: 0.7 MMOL/L — SIGNIFICANT CHANGE UP (ref 0.5–2)
LACTATE SERPL-SCNC: 0.8 MMOL/L — SIGNIFICANT CHANGE UP (ref 0.5–2)
LACTATE SERPL-SCNC: 1.3 MMOL/L — SIGNIFICANT CHANGE UP (ref 0.7–2)
LACTATE SERPL-SCNC: 10.1 MMOL/L — CRITICAL HIGH (ref 0.5–2)
LACTATE SERPL-SCNC: 10.3 MMOL/L — CRITICAL HIGH (ref 0.5–2)
LACTATE SERPL-SCNC: 11.4 MMOL/L — CRITICAL HIGH (ref 0.5–2)
LACTATE SERPL-SCNC: 11.5 MMOL/L — CRITICAL HIGH (ref 0.5–2)
LACTATE SERPL-SCNC: 12.1 MMOL/L — CRITICAL HIGH (ref 0.5–2)
LACTATE SERPL-SCNC: 12.1 MMOL/L — CRITICAL HIGH (ref 0.5–2)
LACTATE SERPL-SCNC: 12.2 MMOL/L — CRITICAL HIGH (ref 0.5–2)
LACTATE SERPL-SCNC: 12.5 MMOL/L — CRITICAL HIGH (ref 0.5–2)
LACTATE SERPL-SCNC: 12.7 MMOL/L — CRITICAL HIGH (ref 0.5–2)
LACTATE SERPL-SCNC: 13.7 MMOL/L — CRITICAL HIGH (ref 0.5–2)
LACTATE SERPL-SCNC: 6.7 MMOL/L — CRITICAL HIGH (ref 0.5–2)
LACTATE SERPL-SCNC: 8.3 MMOL/L — CRITICAL HIGH (ref 0.5–2)
LACTATE SERPL-SCNC: 8.4 MMOL/L — CRITICAL HIGH (ref 0.5–2)
LACTATE SERPL-SCNC: 8.4 MMOL/L — CRITICAL HIGH (ref 0.5–2)
LACTATE SERPL-SCNC: 8.5 MMOL/L — CRITICAL HIGH (ref 0.5–2)
LACTATE SERPL-SCNC: 9.6 MMOL/L — CRITICAL HIGH (ref 0.5–2)
LIDOCAIN IGE QN: 22 U/L — SIGNIFICANT CHANGE UP (ref 7–60)
LYMPHOCYTES # BLD AUTO: 0.43 K/UL — LOW (ref 1–3.3)
LYMPHOCYTES # BLD AUTO: 0.55 K/UL — LOW (ref 1–3.3)
LYMPHOCYTES # BLD AUTO: 0.73 K/UL — LOW (ref 1–3.3)
LYMPHOCYTES # BLD AUTO: 0.89 K/UL — LOW (ref 1–3.3)
LYMPHOCYTES # BLD AUTO: 1.22 K/UL — SIGNIFICANT CHANGE UP (ref 1.2–3.4)
LYMPHOCYTES # BLD AUTO: 1.49 K/UL — SIGNIFICANT CHANGE UP (ref 1–3.3)
LYMPHOCYTES # BLD AUTO: 11.4 % — LOW (ref 13–44)
LYMPHOCYTES # BLD AUTO: 11.6 % — LOW (ref 13–44)
LYMPHOCYTES # BLD AUTO: 13.6 % — SIGNIFICANT CHANGE UP (ref 13–44)
LYMPHOCYTES # BLD AUTO: 27.1 % — SIGNIFICANT CHANGE UP (ref 13–44)
LYMPHOCYTES # BLD AUTO: 27.4 % — SIGNIFICANT CHANGE UP (ref 13–44)
LYMPHOCYTES # BLD AUTO: 9.3 % — LOW (ref 20.5–51.1)
MAGNESIUM SERPL-MCNC: 1.4 MG/DL — LOW (ref 1.6–2.6)
MAGNESIUM SERPL-MCNC: 1.4 MG/DL — LOW (ref 1.6–2.6)
MAGNESIUM SERPL-MCNC: 1.5 MG/DL — LOW (ref 1.6–2.6)
MAGNESIUM SERPL-MCNC: 1.6 MG/DL — SIGNIFICANT CHANGE UP (ref 1.6–2.6)
MAGNESIUM SERPL-MCNC: 1.6 MG/DL — SIGNIFICANT CHANGE UP (ref 1.6–2.6)
MAGNESIUM SERPL-MCNC: 1.7 MG/DL — SIGNIFICANT CHANGE UP (ref 1.6–2.6)
MAGNESIUM SERPL-MCNC: 1.9 MG/DL — SIGNIFICANT CHANGE UP (ref 1.6–2.6)
MAGNESIUM SERPL-MCNC: 2 MG/DL — SIGNIFICANT CHANGE UP (ref 1.6–2.6)
MAGNESIUM SERPL-MCNC: 2 MG/DL — SIGNIFICANT CHANGE UP (ref 1.6–2.6)
MAGNESIUM SERPL-MCNC: 2.1 MG/DL — SIGNIFICANT CHANGE UP (ref 1.6–2.6)
MAGNESIUM SERPL-MCNC: 2.1 MG/DL — SIGNIFICANT CHANGE UP (ref 1.6–2.6)
MAGNESIUM SERPL-MCNC: 2.2 MG/DL — SIGNIFICANT CHANGE UP (ref 1.6–2.6)
MAGNESIUM SERPL-MCNC: 2.2 MG/DL — SIGNIFICANT CHANGE UP (ref 1.8–2.4)
MAGNESIUM SERPL-MCNC: 2.4 MG/DL — SIGNIFICANT CHANGE UP (ref 1.6–2.6)
MAGNESIUM SERPL-MCNC: 2.8 MG/DL — HIGH (ref 1.6–2.6)
MCHC RBC-ENTMCNC: 27.2 PG — SIGNIFICANT CHANGE UP (ref 27–34)
MCHC RBC-ENTMCNC: 27.3 PG — SIGNIFICANT CHANGE UP (ref 27–34)
MCHC RBC-ENTMCNC: 27.3 PG — SIGNIFICANT CHANGE UP (ref 27–34)
MCHC RBC-ENTMCNC: 27.5 PG — SIGNIFICANT CHANGE UP (ref 27–31)
MCHC RBC-ENTMCNC: 27.6 PG — SIGNIFICANT CHANGE UP (ref 27–34)
MCHC RBC-ENTMCNC: 27.6 PG — SIGNIFICANT CHANGE UP (ref 27–34)
MCHC RBC-ENTMCNC: 27.8 PG — SIGNIFICANT CHANGE UP (ref 27–34)
MCHC RBC-ENTMCNC: 28 PG — SIGNIFICANT CHANGE UP (ref 27–34)
MCHC RBC-ENTMCNC: 29 PG — SIGNIFICANT CHANGE UP (ref 27–34)
MCHC RBC-ENTMCNC: 29.2 PG — SIGNIFICANT CHANGE UP (ref 27–34)
MCHC RBC-ENTMCNC: 29.3 PG — SIGNIFICANT CHANGE UP (ref 27–34)
MCHC RBC-ENTMCNC: 29.4 PG — SIGNIFICANT CHANGE UP (ref 27–34)
MCHC RBC-ENTMCNC: 29.5 PG — SIGNIFICANT CHANGE UP (ref 27–34)
MCHC RBC-ENTMCNC: 29.5 PG — SIGNIFICANT CHANGE UP (ref 27–34)
MCHC RBC-ENTMCNC: 29.7 PG — SIGNIFICANT CHANGE UP (ref 27–34)
MCHC RBC-ENTMCNC: 29.9 PG — SIGNIFICANT CHANGE UP (ref 27–34)
MCHC RBC-ENTMCNC: 29.9 PG — SIGNIFICANT CHANGE UP (ref 27–34)
MCHC RBC-ENTMCNC: 30.1 PG — SIGNIFICANT CHANGE UP (ref 27–34)
MCHC RBC-ENTMCNC: 30.2 PG — SIGNIFICANT CHANGE UP (ref 27–34)
MCHC RBC-ENTMCNC: 30.3 PG — SIGNIFICANT CHANGE UP (ref 27–34)
MCHC RBC-ENTMCNC: 30.5 PG — SIGNIFICANT CHANGE UP (ref 27–34)
MCHC RBC-ENTMCNC: 30.7 PG — SIGNIFICANT CHANGE UP (ref 27–34)
MCHC RBC-ENTMCNC: 32.8 GM/DL — SIGNIFICANT CHANGE UP (ref 32–36)
MCHC RBC-ENTMCNC: 33.1 GM/DL — SIGNIFICANT CHANGE UP (ref 32–36)
MCHC RBC-ENTMCNC: 33.2 GM/DL — SIGNIFICANT CHANGE UP (ref 32–36)
MCHC RBC-ENTMCNC: 33.2 GM/DL — SIGNIFICANT CHANGE UP (ref 32–36)
MCHC RBC-ENTMCNC: 33.6 G/DL — SIGNIFICANT CHANGE UP (ref 32–37)
MCHC RBC-ENTMCNC: 33.6 GM/DL — SIGNIFICANT CHANGE UP (ref 32–36)
MCHC RBC-ENTMCNC: 33.7 GM/DL — SIGNIFICANT CHANGE UP (ref 32–36)
MCHC RBC-ENTMCNC: 33.9 GM/DL — SIGNIFICANT CHANGE UP (ref 32–36)
MCHC RBC-ENTMCNC: 34 GM/DL — SIGNIFICANT CHANGE UP (ref 32–36)
MCHC RBC-ENTMCNC: 34.1 GM/DL — SIGNIFICANT CHANGE UP (ref 32–36)
MCHC RBC-ENTMCNC: 34.3 GM/DL — SIGNIFICANT CHANGE UP (ref 32–36)
MCHC RBC-ENTMCNC: 34.4 GM/DL — SIGNIFICANT CHANGE UP (ref 32–36)
MCHC RBC-ENTMCNC: 34.6 GM/DL — SIGNIFICANT CHANGE UP (ref 32–36)
MCHC RBC-ENTMCNC: 34.8 GM/DL — SIGNIFICANT CHANGE UP (ref 32–36)
MCV RBC AUTO: 81.2 FL — SIGNIFICANT CHANGE UP (ref 80–100)
MCV RBC AUTO: 81.4 FL — SIGNIFICANT CHANGE UP (ref 80–100)
MCV RBC AUTO: 81.9 FL — SIGNIFICANT CHANGE UP (ref 80–94)
MCV RBC AUTO: 82 FL — SIGNIFICANT CHANGE UP (ref 80–100)
MCV RBC AUTO: 82.1 FL — SIGNIFICANT CHANGE UP (ref 80–100)
MCV RBC AUTO: 82.5 FL — SIGNIFICANT CHANGE UP (ref 80–100)
MCV RBC AUTO: 82.6 FL — SIGNIFICANT CHANGE UP (ref 80–100)
MCV RBC AUTO: 83 FL — SIGNIFICANT CHANGE UP (ref 80–100)
MCV RBC AUTO: 85.5 FL — SIGNIFICANT CHANGE UP (ref 80–100)
MCV RBC AUTO: 86.3 FL — SIGNIFICANT CHANGE UP (ref 80–100)
MCV RBC AUTO: 87.5 FL — SIGNIFICANT CHANGE UP (ref 80–100)
MCV RBC AUTO: 87.5 FL — SIGNIFICANT CHANGE UP (ref 80–100)
MCV RBC AUTO: 87.8 FL — SIGNIFICANT CHANGE UP (ref 80–100)
MCV RBC AUTO: 88.2 FL — SIGNIFICANT CHANGE UP (ref 80–100)
MCV RBC AUTO: 88.3 FL — SIGNIFICANT CHANGE UP (ref 80–100)
MCV RBC AUTO: 88.5 FL — SIGNIFICANT CHANGE UP (ref 80–100)
MCV RBC AUTO: 88.7 FL — SIGNIFICANT CHANGE UP (ref 80–100)
MCV RBC AUTO: 88.9 FL — SIGNIFICANT CHANGE UP (ref 80–100)
MCV RBC AUTO: 88.9 FL — SIGNIFICANT CHANGE UP (ref 80–100)
MCV RBC AUTO: 89 FL — SIGNIFICANT CHANGE UP (ref 80–100)
MCV RBC AUTO: 89.2 FL — SIGNIFICANT CHANGE UP (ref 80–100)
MCV RBC AUTO: 89.9 FL — SIGNIFICANT CHANGE UP (ref 80–100)
MONOCYTES # BLD AUTO: 0.04 K/UL — SIGNIFICANT CHANGE UP (ref 0–0.9)
MONOCYTES # BLD AUTO: 0.07 K/UL — SIGNIFICANT CHANGE UP (ref 0–0.9)
MONOCYTES # BLD AUTO: 0.41 K/UL — SIGNIFICANT CHANGE UP (ref 0–0.9)
MONOCYTES # BLD AUTO: 0.44 K/UL — SIGNIFICANT CHANGE UP (ref 0–0.9)
MONOCYTES # BLD AUTO: 1.05 K/UL — HIGH (ref 0.1–0.6)
MONOCYTES # BLD AUTO: 1.33 K/UL — HIGH (ref 0–0.9)
MONOCYTES NFR BLD AUTO: 12.1 % — SIGNIFICANT CHANGE UP (ref 2–14)
MONOCYTES NFR BLD AUTO: 2 % — SIGNIFICANT CHANGE UP (ref 2–14)
MONOCYTES NFR BLD AUTO: 4.5 % — SIGNIFICANT CHANGE UP (ref 2–14)
MONOCYTES NFR BLD AUTO: 5.6 % — SIGNIFICANT CHANGE UP (ref 2–14)
MONOCYTES NFR BLD AUTO: 6.5 % — SIGNIFICANT CHANGE UP (ref 2–14)
MONOCYTES NFR BLD AUTO: 8 % — SIGNIFICANT CHANGE UP (ref 1.7–9.3)
NEUTROPHILS # BLD AUTO: 1.02 K/UL — LOW (ref 1.8–7.4)
NEUTROPHILS # BLD AUTO: 1.4 K/UL — LOW (ref 1.8–7.4)
NEUTROPHILS # BLD AUTO: 10.71 K/UL — HIGH (ref 1.4–6.5)
NEUTROPHILS # BLD AUTO: 5.08 K/UL — SIGNIFICANT CHANGE UP (ref 1.8–7.4)
NEUTROPHILS # BLD AUTO: 6.17 K/UL — SIGNIFICANT CHANGE UP (ref 1.8–7.4)
NEUTROPHILS # BLD AUTO: 8 K/UL — HIGH (ref 1.8–7.4)
NEUTROPHILS NFR BLD AUTO: 64.9 % — SIGNIFICANT CHANGE UP (ref 43–77)
NEUTROPHILS NFR BLD AUTO: 68.9 % — SIGNIFICANT CHANGE UP (ref 43–77)
NEUTROPHILS NFR BLD AUTO: 72.9 % — SIGNIFICANT CHANGE UP (ref 43–77)
NEUTROPHILS NFR BLD AUTO: 78.9 % — HIGH (ref 43–77)
NEUTROPHILS NFR BLD AUTO: 80.4 % — HIGH (ref 43–77)
NEUTROPHILS NFR BLD AUTO: 82.1 % — HIGH (ref 42.2–75.2)
NRBC # BLD: 0 /100 WBCS — SIGNIFICANT CHANGE UP (ref 0–0)
NRBC # BLD: 1 /100 WBCS — HIGH (ref 0–0)
NRBC # BLD: 1 /100 WBCS — HIGH (ref 0–0)
O2 CT VFR BLD CALC: 26 MMHG — LOW (ref 30–65)
PCO2 BLDA: 34 MMHG — LOW (ref 35–48)
PCO2 BLDMV: 42 MMHG — SIGNIFICANT CHANGE UP (ref 30–65)
PH BLDA: 7.46 — HIGH (ref 7.35–7.45)
PH BLDMV: 7.32 — SIGNIFICANT CHANGE UP (ref 7.2–7.45)
PHOSPHATE SERPL-MCNC: 2.1 MG/DL — LOW (ref 2.5–4.5)
PHOSPHATE SERPL-MCNC: 2.2 MG/DL — LOW (ref 2.5–4.5)
PHOSPHATE SERPL-MCNC: 2.6 MG/DL — SIGNIFICANT CHANGE UP (ref 2.5–4.5)
PHOSPHATE SERPL-MCNC: 3.2 MG/DL — SIGNIFICANT CHANGE UP (ref 2.5–4.5)
PHOSPHATE SERPL-MCNC: 3.3 MG/DL — SIGNIFICANT CHANGE UP (ref 2.5–4.5)
PHOSPHATE SERPL-MCNC: 3.4 MG/DL — SIGNIFICANT CHANGE UP (ref 2.5–4.5)
PHOSPHATE SERPL-MCNC: 3.4 MG/DL — SIGNIFICANT CHANGE UP (ref 2.5–4.5)
PHOSPHATE SERPL-MCNC: 3.8 MG/DL — SIGNIFICANT CHANGE UP (ref 2.5–4.5)
PHOSPHATE SERPL-MCNC: 4.1 MG/DL — SIGNIFICANT CHANGE UP (ref 2.5–4.5)
PHOSPHATE SERPL-MCNC: 4.5 MG/DL — SIGNIFICANT CHANGE UP (ref 2.5–4.5)
PHOSPHATE SERPL-MCNC: 4.9 MG/DL — HIGH (ref 2.5–4.5)
PHOSPHATE SERPL-MCNC: 5.2 MG/DL — HIGH (ref 2.5–4.5)
PHOSPHATE SERPL-MCNC: 5.4 MG/DL — HIGH (ref 2.5–4.5)
PHOSPHATE SERPL-MCNC: 6.6 MG/DL — HIGH (ref 2.5–4.5)
PHOSPHATE SERPL-MCNC: 7.1 MG/DL — HIGH (ref 2.5–4.5)
PHOSPHATE SERPL-MCNC: 7.2 MG/DL — HIGH (ref 2.5–4.5)
PHOSPHATE SERPL-MCNC: 8 MG/DL — HIGH (ref 2.5–4.5)
PLATELET # BLD AUTO: 105 K/UL — LOW (ref 150–400)
PLATELET # BLD AUTO: 112 K/UL — LOW (ref 150–400)
PLATELET # BLD AUTO: 120 K/UL — LOW (ref 150–400)
PLATELET # BLD AUTO: 122 K/UL — LOW (ref 150–400)
PLATELET # BLD AUTO: 187 K/UL — SIGNIFICANT CHANGE UP (ref 150–400)
PLATELET # BLD AUTO: 197 K/UL — SIGNIFICANT CHANGE UP (ref 150–400)
PLATELET # BLD AUTO: 215 K/UL — SIGNIFICANT CHANGE UP (ref 150–400)
PLATELET # BLD AUTO: 220 K/UL — SIGNIFICANT CHANGE UP (ref 150–400)
PLATELET # BLD AUTO: 226 K/UL — SIGNIFICANT CHANGE UP (ref 150–400)
PLATELET # BLD AUTO: 232 K/UL — SIGNIFICANT CHANGE UP (ref 150–400)
PLATELET # BLD AUTO: 258 K/UL — SIGNIFICANT CHANGE UP (ref 150–400)
PLATELET # BLD AUTO: 286 K/UL — SIGNIFICANT CHANGE UP (ref 130–400)
PLATELET # BLD AUTO: 53 K/UL — LOW (ref 150–400)
PLATELET # BLD AUTO: 54 K/UL — LOW (ref 150–400)
PLATELET # BLD AUTO: 65 K/UL — LOW (ref 150–400)
PLATELET # BLD AUTO: 73 K/UL — LOW (ref 150–400)
PLATELET # BLD AUTO: 73 K/UL — LOW (ref 150–400)
PLATELET # BLD AUTO: 77 K/UL — LOW (ref 150–400)
PLATELET # BLD AUTO: 80 K/UL — LOW (ref 150–400)
PLATELET # BLD AUTO: 84 K/UL — LOW (ref 150–400)
PLATELET # BLD AUTO: 91 K/UL — LOW (ref 150–400)
PLATELET # BLD AUTO: 96 K/UL — LOW (ref 150–400)
PO2 BLDA: 90 MMHG — SIGNIFICANT CHANGE UP (ref 83–108)
POTASSIUM SERPL-MCNC: 3.3 MMOL/L — LOW (ref 3.5–5.3)
POTASSIUM SERPL-MCNC: 3.4 MMOL/L — LOW (ref 3.5–5.3)
POTASSIUM SERPL-MCNC: 3.5 MMOL/L — SIGNIFICANT CHANGE UP (ref 3.5–5.3)
POTASSIUM SERPL-MCNC: 3.7 MMOL/L — SIGNIFICANT CHANGE UP (ref 3.5–5.3)
POTASSIUM SERPL-MCNC: 3.8 MMOL/L — SIGNIFICANT CHANGE UP (ref 3.5–5.3)
POTASSIUM SERPL-MCNC: 3.9 MMOL/L — SIGNIFICANT CHANGE UP (ref 3.5–5)
POTASSIUM SERPL-MCNC: 4 MMOL/L — SIGNIFICANT CHANGE UP (ref 3.5–5.3)
POTASSIUM SERPL-MCNC: 4 MMOL/L — SIGNIFICANT CHANGE UP (ref 3.5–5.3)
POTASSIUM SERPL-MCNC: 4.1 MMOL/L — SIGNIFICANT CHANGE UP (ref 3.5–5.3)
POTASSIUM SERPL-MCNC: 4.2 MMOL/L — SIGNIFICANT CHANGE UP (ref 3.5–5.3)
POTASSIUM SERPL-MCNC: 4.3 MMOL/L — SIGNIFICANT CHANGE UP (ref 3.5–5.3)
POTASSIUM SERPL-MCNC: 4.3 MMOL/L — SIGNIFICANT CHANGE UP (ref 3.5–5.3)
POTASSIUM SERPL-MCNC: 4.5 MMOL/L — SIGNIFICANT CHANGE UP (ref 3.5–5.3)
POTASSIUM SERPL-MCNC: 4.6 MMOL/L — SIGNIFICANT CHANGE UP (ref 3.5–5.3)
POTASSIUM SERPL-MCNC: 4.6 MMOL/L — SIGNIFICANT CHANGE UP (ref 3.5–5.3)
POTASSIUM SERPL-MCNC: SIGNIFICANT CHANGE UP MMOL/L (ref 3.5–5.3)
POTASSIUM SERPL-SCNC: 3.3 MMOL/L — LOW (ref 3.5–5.3)
POTASSIUM SERPL-SCNC: 3.4 MMOL/L — LOW (ref 3.5–5.3)
POTASSIUM SERPL-SCNC: 3.5 MMOL/L — SIGNIFICANT CHANGE UP (ref 3.5–5.3)
POTASSIUM SERPL-SCNC: 3.7 MMOL/L — SIGNIFICANT CHANGE UP (ref 3.5–5.3)
POTASSIUM SERPL-SCNC: 3.8 MMOL/L — SIGNIFICANT CHANGE UP (ref 3.5–5.3)
POTASSIUM SERPL-SCNC: 3.9 MMOL/L — SIGNIFICANT CHANGE UP (ref 3.5–5)
POTASSIUM SERPL-SCNC: 4 MMOL/L — SIGNIFICANT CHANGE UP (ref 3.5–5.3)
POTASSIUM SERPL-SCNC: 4 MMOL/L — SIGNIFICANT CHANGE UP (ref 3.5–5.3)
POTASSIUM SERPL-SCNC: 4.1 MMOL/L — SIGNIFICANT CHANGE UP (ref 3.5–5.3)
POTASSIUM SERPL-SCNC: 4.2 MMOL/L — SIGNIFICANT CHANGE UP (ref 3.5–5.3)
POTASSIUM SERPL-SCNC: 4.3 MMOL/L — SIGNIFICANT CHANGE UP (ref 3.5–5.3)
POTASSIUM SERPL-SCNC: 4.3 MMOL/L — SIGNIFICANT CHANGE UP (ref 3.5–5.3)
POTASSIUM SERPL-SCNC: 4.5 MMOL/L — SIGNIFICANT CHANGE UP (ref 3.5–5.3)
POTASSIUM SERPL-SCNC: 4.6 MMOL/L — SIGNIFICANT CHANGE UP (ref 3.5–5.3)
POTASSIUM SERPL-SCNC: 4.6 MMOL/L — SIGNIFICANT CHANGE UP (ref 3.5–5.3)
POTASSIUM SERPL-SCNC: SIGNIFICANT CHANGE UP MMOL/L (ref 3.5–5.3)
PROT SERPL-MCNC: 2.2 G/DL — LOW (ref 6–8.3)
PROT SERPL-MCNC: 2.6 G/DL — LOW (ref 6–8.3)
PROT SERPL-MCNC: 2.8 G/DL — LOW (ref 6–8.3)
PROT SERPL-MCNC: 2.9 G/DL — LOW (ref 6–8.3)
PROT SERPL-MCNC: 3.2 G/DL — LOW (ref 6–8.3)
PROT SERPL-MCNC: 3.6 G/DL — LOW (ref 6–8.3)
PROT SERPL-MCNC: 3.7 G/DL — LOW (ref 6–8.3)
PROT SERPL-MCNC: 3.7 G/DL — LOW (ref 6–8.3)
PROT SERPL-MCNC: 3.9 G/DL — LOW (ref 6–8.3)
PROT SERPL-MCNC: 3.9 G/DL — LOW (ref 6–8.3)
PROT SERPL-MCNC: 4 G/DL — LOW (ref 6–8.3)
PROT SERPL-MCNC: 4.2 G/DL — LOW (ref 6–8.3)
PROT SERPL-MCNC: 6.7 G/DL — SIGNIFICANT CHANGE UP (ref 6–8.3)
PROT SERPL-MCNC: 6.9 G/DL — SIGNIFICANT CHANGE UP (ref 6–8.3)
PROT SERPL-MCNC: 7.1 G/DL — SIGNIFICANT CHANGE UP (ref 6–8.3)
PROT SERPL-MCNC: 8.5 G/DL — HIGH (ref 6–8)
PROT SERPL-MCNC: 9 G/DL — HIGH (ref 6–8.3)
PROTHROM AB SERPL-ACNC: 11.6 SEC — SIGNIFICANT CHANGE UP (ref 10.6–13.6)
PROTHROM AB SERPL-ACNC: 12 SEC — SIGNIFICANT CHANGE UP (ref 10.6–13.6)
PROTHROM AB SERPL-ACNC: 12.5 SEC — SIGNIFICANT CHANGE UP (ref 10.6–13.6)
PROTHROM AB SERPL-ACNC: 12.7 SEC — SIGNIFICANT CHANGE UP (ref 9.95–12.87)
PROTHROM AB SERPL-ACNC: 12.9 SEC — SIGNIFICANT CHANGE UP (ref 10.6–13.6)
PROTHROM AB SERPL-ACNC: 13.2 SEC — SIGNIFICANT CHANGE UP (ref 10.6–13.6)
PROTHROM AB SERPL-ACNC: 13.2 SEC — SIGNIFICANT CHANGE UP (ref 10.6–13.6)
PROTHROM AB SERPL-ACNC: 13.6 SEC — SIGNIFICANT CHANGE UP (ref 10.6–13.6)
PROTHROM AB SERPL-ACNC: 14.2 SEC — HIGH (ref 10.6–13.6)
PROTHROM AB SERPL-ACNC: 14.2 SEC — HIGH (ref 10.6–13.6)
PROTHROM AB SERPL-ACNC: 14.5 SEC — HIGH (ref 10.6–13.6)
PROTHROM AB SERPL-ACNC: 14.6 SEC — HIGH (ref 10.6–13.6)
PROTHROM AB SERPL-ACNC: 14.8 SEC — HIGH (ref 10.6–13.6)
PROTHROM AB SERPL-ACNC: 14.9 SEC — HIGH (ref 10.6–13.6)
PROTHROM AB SERPL-ACNC: 16.3 SEC — HIGH (ref 10.6–13.6)
PROTHROM AB SERPL-ACNC: 17.3 SEC — HIGH (ref 10.6–13.6)
PROTHROM AB SERPL-ACNC: 18.3 SEC — HIGH (ref 10.6–13.6)
PROTHROM AB SERPL-ACNC: 18.7 SEC — HIGH (ref 10.6–13.6)
PROTHROM AB SERPL-ACNC: 19.6 SEC — HIGH (ref 10.6–13.6)
PROTHROM AB SERPL-ACNC: 19.7 SEC — HIGH (ref 10.6–13.6)
PROTHROM AB SERPL-ACNC: 20.9 SEC — HIGH (ref 10.6–13.6)
PROTHROM AB SERPL-ACNC: 24.1 SEC — HIGH (ref 10.6–13.6)
RBC # BLD: 2.8 M/UL — LOW (ref 4.2–5.8)
RBC # BLD: 2.88 M/UL — LOW (ref 4.2–5.8)
RBC # BLD: 3 M/UL — LOW (ref 4.2–5.8)
RBC # BLD: 3 M/UL — LOW (ref 4.2–5.8)
RBC # BLD: 3.17 M/UL — LOW (ref 4.2–5.8)
RBC # BLD: 3.25 M/UL — LOW (ref 4.2–5.8)
RBC # BLD: 3.35 M/UL — LOW (ref 4.2–5.8)
RBC # BLD: 3.58 M/UL — LOW (ref 4.2–5.8)
RBC # BLD: 3.6 M/UL — LOW (ref 4.2–5.8)
RBC # BLD: 3.6 M/UL — LOW (ref 4.2–5.8)
RBC # BLD: 3.66 M/UL — LOW (ref 4.2–5.8)
RBC # BLD: 3.67 M/UL — LOW (ref 4.2–5.8)
RBC # BLD: 3.72 M/UL — LOW (ref 4.2–5.8)
RBC # BLD: 3.75 M/UL — LOW (ref 4.2–5.8)
RBC # BLD: 3.85 M/UL — LOW (ref 4.2–5.8)
RBC # BLD: 4.14 M/UL — LOW (ref 4.2–5.8)
RBC # BLD: 4.17 M/UL — LOW (ref 4.2–5.8)
RBC # BLD: 4.27 M/UL — SIGNIFICANT CHANGE UP (ref 4.2–5.8)
RBC # BLD: 4.35 M/UL — SIGNIFICANT CHANGE UP (ref 4.2–5.8)
RBC # BLD: 4.47 M/UL — SIGNIFICANT CHANGE UP (ref 4.2–5.8)
RBC # BLD: 5.25 M/UL — SIGNIFICANT CHANGE UP (ref 4.2–5.8)
RBC # BLD: 5.52 M/UL — SIGNIFICANT CHANGE UP (ref 4.7–6.1)
RBC # FLD: 14 % — SIGNIFICANT CHANGE UP (ref 11.5–14.5)
RBC # FLD: 14.1 % — SIGNIFICANT CHANGE UP (ref 10.3–14.5)
RBC # FLD: 14.3 % — SIGNIFICANT CHANGE UP (ref 10.3–14.5)
RBC # FLD: 14.5 % — SIGNIFICANT CHANGE UP (ref 10.3–14.5)
RBC # FLD: 14.6 % — HIGH (ref 10.3–14.5)
RBC # FLD: 14.6 % — HIGH (ref 10.3–14.5)
RBC # FLD: 14.7 % — HIGH (ref 10.3–14.5)
RBC # FLD: 14.9 % — HIGH (ref 10.3–14.5)
RBC # FLD: 15.1 % — HIGH (ref 10.3–14.5)
RBC # FLD: 15.2 % — HIGH (ref 10.3–14.5)
RBC # FLD: 15.3 % — HIGH (ref 10.3–14.5)
RBC # FLD: 15.5 % — HIGH (ref 10.3–14.5)
RBC # FLD: 15.9 % — HIGH (ref 10.3–14.5)
RH IG SCN BLD-IMP: POSITIVE — SIGNIFICANT CHANGE UP
RH IG SCN BLD-IMP: POSITIVE — SIGNIFICANT CHANGE UP
SAO2 % BLDA: 97 % — SIGNIFICANT CHANGE UP (ref 95–100)
SAO2 % BLDMV: 46 % — SIGNIFICANT CHANGE UP
SARS-COV-2 RNA SPEC QL NAA+PROBE: SIGNIFICANT CHANGE UP
SODIUM SERPL-SCNC: 136 MMOL/L — SIGNIFICANT CHANGE UP (ref 135–145)
SODIUM SERPL-SCNC: 138 MMOL/L — SIGNIFICANT CHANGE UP (ref 135–145)
SODIUM SERPL-SCNC: 138 MMOL/L — SIGNIFICANT CHANGE UP (ref 135–146)
SODIUM SERPL-SCNC: 140 MMOL/L — SIGNIFICANT CHANGE UP (ref 135–145)
SODIUM SERPL-SCNC: 141 MMOL/L — SIGNIFICANT CHANGE UP (ref 135–145)
SODIUM SERPL-SCNC: 142 MMOL/L — SIGNIFICANT CHANGE UP (ref 135–145)
SODIUM SERPL-SCNC: 142 MMOL/L — SIGNIFICANT CHANGE UP (ref 135–145)
SODIUM SERPL-SCNC: 143 MMOL/L — SIGNIFICANT CHANGE UP (ref 135–145)
SODIUM SERPL-SCNC: 145 MMOL/L — SIGNIFICANT CHANGE UP (ref 135–145)
SODIUM SERPL-SCNC: 146 MMOL/L — HIGH (ref 135–145)
SODIUM SERPL-SCNC: 146 MMOL/L — HIGH (ref 135–145)
SODIUM SERPL-SCNC: 147 MMOL/L — HIGH (ref 135–145)
SODIUM SERPL-SCNC: 147 MMOL/L — HIGH (ref 135–145)
SODIUM SERPL-SCNC: 148 MMOL/L — HIGH (ref 135–145)
SODIUM SERPL-SCNC: 149 MMOL/L — HIGH (ref 135–145)
SODIUM SERPL-SCNC: 149 MMOL/L — HIGH (ref 135–145)
SODIUM SERPL-SCNC: 150 MMOL/L — HIGH (ref 135–145)
SODIUM SERPL-SCNC: 150 MMOL/L — HIGH (ref 135–145)
SODIUM SERPL-SCNC: 152 MMOL/L — HIGH (ref 135–145)
SURGICAL PATHOLOGY STUDY: SIGNIFICANT CHANGE UP
TROPONIN T SERPL-MCNC: 0.67 NG/ML — CRITICAL HIGH (ref 0–0.01)
TROPONIN T SERPL-MCNC: <0.01 NG/ML — SIGNIFICANT CHANGE UP
TSH SERPL-MCNC: 1.05 UIU/ML — SIGNIFICANT CHANGE UP (ref 0.35–4.94)
WBC # BLD: 1.65 K/UL — LOW (ref 3.8–10.5)
WBC # BLD: 1.68 K/UL — LOW (ref 3.8–10.5)
WBC # BLD: 10.97 K/UL — HIGH (ref 3.8–10.5)
WBC # BLD: 13.05 K/UL — HIGH (ref 4.8–10.8)
WBC # BLD: 2.03 K/UL — LOW (ref 3.8–10.5)
WBC # BLD: 2.78 K/UL — LOW (ref 3.8–10.5)
WBC # BLD: 3.59 K/UL — LOW (ref 3.8–10.5)
WBC # BLD: 3.63 K/UL — LOW (ref 3.8–10.5)
WBC # BLD: 3.66 K/UL — LOW (ref 3.8–10.5)
WBC # BLD: 3.7 K/UL — LOW (ref 3.8–10.5)
WBC # BLD: 3.86 K/UL — SIGNIFICANT CHANGE UP (ref 3.8–10.5)
WBC # BLD: 3.9 K/UL — SIGNIFICANT CHANGE UP (ref 3.8–10.5)
WBC # BLD: 4.26 K/UL — SIGNIFICANT CHANGE UP (ref 3.8–10.5)
WBC # BLD: 4.54 K/UL — SIGNIFICANT CHANGE UP (ref 3.8–10.5)
WBC # BLD: 6.31 K/UL — SIGNIFICANT CHANGE UP (ref 3.8–10.5)
WBC # BLD: 7.03 K/UL — SIGNIFICANT CHANGE UP (ref 3.8–10.5)
WBC # BLD: 7.32 K/UL — SIGNIFICANT CHANGE UP (ref 3.8–10.5)
WBC # BLD: 7.82 K/UL — SIGNIFICANT CHANGE UP (ref 3.8–10.5)
WBC # BLD: 7.84 K/UL — SIGNIFICANT CHANGE UP (ref 3.8–10.5)
WBC # BLD: 8.17 K/UL — SIGNIFICANT CHANGE UP (ref 3.8–10.5)
WBC # BLD: 8.86 K/UL — SIGNIFICANT CHANGE UP (ref 3.8–10.5)
WBC # BLD: 9.55 K/UL — SIGNIFICANT CHANGE UP (ref 3.8–10.5)
WBC # FLD AUTO: 1.65 K/UL — LOW (ref 3.8–10.5)
WBC # FLD AUTO: 1.68 K/UL — LOW (ref 3.8–10.5)
WBC # FLD AUTO: 10.97 K/UL — HIGH (ref 3.8–10.5)
WBC # FLD AUTO: 13.05 K/UL — HIGH (ref 4.8–10.8)
WBC # FLD AUTO: 2.03 K/UL — LOW (ref 3.8–10.5)
WBC # FLD AUTO: 2.78 K/UL — LOW (ref 3.8–10.5)
WBC # FLD AUTO: 3.59 K/UL — LOW (ref 3.8–10.5)
WBC # FLD AUTO: 3.63 K/UL — LOW (ref 3.8–10.5)
WBC # FLD AUTO: 3.66 K/UL — LOW (ref 3.8–10.5)
WBC # FLD AUTO: 3.7 K/UL — LOW (ref 3.8–10.5)
WBC # FLD AUTO: 3.86 K/UL — SIGNIFICANT CHANGE UP (ref 3.8–10.5)
WBC # FLD AUTO: 3.9 K/UL — SIGNIFICANT CHANGE UP (ref 3.8–10.5)
WBC # FLD AUTO: 4.26 K/UL — SIGNIFICANT CHANGE UP (ref 3.8–10.5)
WBC # FLD AUTO: 4.54 K/UL — SIGNIFICANT CHANGE UP (ref 3.8–10.5)
WBC # FLD AUTO: 6.31 K/UL — SIGNIFICANT CHANGE UP (ref 3.8–10.5)
WBC # FLD AUTO: 7.03 K/UL — SIGNIFICANT CHANGE UP (ref 3.8–10.5)
WBC # FLD AUTO: 7.32 K/UL — SIGNIFICANT CHANGE UP (ref 3.8–10.5)
WBC # FLD AUTO: 7.82 K/UL — SIGNIFICANT CHANGE UP (ref 3.8–10.5)
WBC # FLD AUTO: 7.84 K/UL — SIGNIFICANT CHANGE UP (ref 3.8–10.5)
WBC # FLD AUTO: 8.17 K/UL — SIGNIFICANT CHANGE UP (ref 3.8–10.5)
WBC # FLD AUTO: 8.86 K/UL — SIGNIFICANT CHANGE UP (ref 3.8–10.5)
WBC # FLD AUTO: 9.55 K/UL — SIGNIFICANT CHANGE UP (ref 3.8–10.5)

## 2020-01-01 PROCEDURE — 71046 X-RAY EXAM CHEST 2 VIEWS: CPT | Mod: 26

## 2020-01-01 PROCEDURE — 71045 X-RAY EXAM CHEST 1 VIEW: CPT | Mod: 26,76

## 2020-01-01 PROCEDURE — 99291 CRITICAL CARE FIRST HOUR: CPT

## 2020-01-01 PROCEDURE — 31645 BRNCHSC W/THER ASPIR 1ST: CPT

## 2020-01-01 PROCEDURE — 88304 TISSUE EXAM BY PATHOLOGIST: CPT | Mod: 26

## 2020-01-01 PROCEDURE — 99291 CRITICAL CARE FIRST HOUR: CPT | Mod: 25

## 2020-01-01 PROCEDURE — 94010 BREATHING CAPACITY TEST: CPT | Mod: 26

## 2020-01-01 PROCEDURE — 71045 X-RAY EXAM CHEST 1 VIEW: CPT | Mod: 26,77

## 2020-01-01 PROCEDURE — 76937 US GUIDE VASCULAR ACCESS: CPT | Mod: 26

## 2020-01-01 PROCEDURE — 93010 ELECTROCARDIOGRAM REPORT: CPT

## 2020-01-01 PROCEDURE — 35631 BPG AOR-CELIAC-MSN-RENAL: CPT | Mod: GC,62

## 2020-01-01 PROCEDURE — 72100 X-RAY EXAM L-S SPINE 2/3 VWS: CPT | Mod: 26

## 2020-01-01 PROCEDURE — 33025 INCISION OF HEART SAC: CPT

## 2020-01-01 PROCEDURE — 71275 CT ANGIOGRAPHY CHEST: CPT | Mod: 26

## 2020-01-01 PROCEDURE — 90945 DIALYSIS ONE EVALUATION: CPT

## 2020-01-01 PROCEDURE — 36556 INSERT NON-TUNNEL CV CATH: CPT

## 2020-01-01 PROCEDURE — 33948 ECMO/ECLS DAILY MGMT-VENOUS: CPT

## 2020-01-01 PROCEDURE — 99233 SBSQ HOSP IP/OBS HIGH 50: CPT

## 2020-01-01 PROCEDURE — 33946 ECMO/ECLS INITIATION VENOUS: CPT | Mod: 22

## 2020-01-01 PROCEDURE — 70450 CT HEAD/BRAIN W/O DYE: CPT | Mod: 26

## 2020-01-01 PROCEDURE — 93308 TTE F-UP OR LMTD: CPT | Mod: 26

## 2020-01-01 PROCEDURE — 99292 CRITICAL CARE ADDL 30 MIN: CPT

## 2020-01-01 PROCEDURE — 49000 EXPLORATION OF ABDOMEN: CPT | Mod: 58

## 2020-01-01 PROCEDURE — 99205 OFFICE O/P NEW HI 60 MIN: CPT

## 2020-01-01 PROCEDURE — 71045 X-RAY EXAM CHEST 1 VIEW: CPT | Mod: 26

## 2020-01-01 PROCEDURE — 99292 CRITICAL CARE ADDL 30 MIN: CPT | Mod: 25

## 2020-01-01 PROCEDURE — 36620 INSERTION CATHETER ARTERY: CPT

## 2020-01-01 PROCEDURE — 33877 THORACOABDOMINAL GRAFT: CPT | Mod: GC,62

## 2020-01-01 PROCEDURE — 99232 SBSQ HOSP IP/OBS MODERATE 35: CPT

## 2020-01-01 PROCEDURE — 93880 EXTRACRANIAL BILAT STUDY: CPT | Mod: 26

## 2020-01-01 PROCEDURE — 35631 BPG AOR-CELIAC-MSN-RENAL: CPT | Mod: 59,62

## 2020-01-01 PROCEDURE — 33952 ECMO/ECLS INSJ PRPH CANNULA: CPT | Mod: 22,59

## 2020-01-01 PROCEDURE — 88307 TISSUE EXAM BY PATHOLOGIST: CPT | Mod: 26

## 2020-01-01 PROCEDURE — 33877 THORACOABDOMINAL GRAFT: CPT | Mod: 62

## 2020-01-01 PROCEDURE — 93306 TTE W/DOPPLER COMPLETE: CPT | Mod: 26

## 2020-01-01 RX ORDER — DEXTROSE 50 % IN WATER 50 %
15 SYRINGE (ML) INTRAVENOUS ONCE
Refills: 0 | Status: DISCONTINUED | OUTPATIENT
Start: 2020-01-01 | End: 2020-01-01

## 2020-01-01 RX ORDER — DOBUTAMINE HCL 250MG/20ML
2.5 VIAL (ML) INTRAVENOUS
Qty: 500 | Refills: 0 | Status: DISCONTINUED | OUTPATIENT
Start: 2020-01-01 | End: 2020-01-01

## 2020-01-01 RX ORDER — MIRTAZAPINE 45 MG/1
15 TABLET, ORALLY DISINTEGRATING ORAL DAILY
Refills: 0 | Status: DISCONTINUED | OUTPATIENT
Start: 2020-01-01 | End: 2020-01-01

## 2020-01-01 RX ORDER — PHENYLEPHRINE HYDROCHLORIDE 10 MG/ML
0.3 INJECTION INTRAVENOUS
Qty: 160 | Refills: 0 | Status: DISCONTINUED | OUTPATIENT
Start: 2020-01-01 | End: 2020-01-01

## 2020-01-01 RX ORDER — NICARDIPINE HYDROCHLORIDE 30 MG/1
5 CAPSULE, EXTENDED RELEASE ORAL
Qty: 40 | Refills: 0 | Status: DISCONTINUED | OUTPATIENT
Start: 2020-01-01 | End: 2020-01-01

## 2020-01-01 RX ORDER — PANTOPRAZOLE SODIUM 20 MG/1
40 TABLET, DELAYED RELEASE ORAL
Refills: 0 | Status: DISCONTINUED | OUTPATIENT
Start: 2020-01-01 | End: 2020-01-01

## 2020-01-01 RX ORDER — METOPROLOL TARTRATE 50 MG
1 TABLET ORAL
Qty: 0 | Refills: 0 | DISCHARGE

## 2020-01-01 RX ORDER — LABETALOL HCL 100 MG
20 TABLET ORAL ONCE
Refills: 0 | Status: COMPLETED | OUTPATIENT
Start: 2020-01-01 | End: 2020-01-01

## 2020-01-01 RX ORDER — METRONIDAZOLE 500 MG
500 TABLET ORAL EVERY 8 HOURS
Refills: 0 | Status: DISCONTINUED | OUTPATIENT
Start: 2020-01-01 | End: 2020-01-01

## 2020-01-01 RX ORDER — FILGRASTIM 480MCG/1.6
600 VIAL (ML) INJECTION ONCE
Refills: 0 | Status: COMPLETED | OUTPATIENT
Start: 2020-01-01 | End: 2020-01-01

## 2020-01-01 RX ORDER — FENTANYL CITRATE 50 UG/ML
25 INJECTION INTRAVENOUS ONCE
Refills: 0 | Status: DISCONTINUED | OUTPATIENT
Start: 2020-01-01 | End: 2020-01-01

## 2020-01-01 RX ORDER — MORPHINE SULFATE 50 MG/1
4 CAPSULE, EXTENDED RELEASE ORAL ONCE
Refills: 0 | Status: DISCONTINUED | OUTPATIENT
Start: 2020-01-01 | End: 2020-01-01

## 2020-01-01 RX ORDER — CISATRACURIUM BESYLATE 2 MG/ML
10 INJECTION INTRAVENOUS ONCE
Refills: 0 | Status: COMPLETED | OUTPATIENT
Start: 2020-01-01 | End: 2020-01-01

## 2020-01-01 RX ORDER — SODIUM CHLORIDE 9 MG/ML
3 INJECTION INTRAMUSCULAR; INTRAVENOUS; SUBCUTANEOUS EVERY 8 HOURS
Refills: 0 | Status: DISCONTINUED | OUTPATIENT
Start: 2020-01-01 | End: 2020-01-01

## 2020-01-01 RX ORDER — FILGRASTIM 480MCG/1.6
600 VIAL (ML) INJECTION ONCE
Refills: 0 | Status: DISCONTINUED | OUTPATIENT
Start: 2020-01-01 | End: 2020-01-01

## 2020-01-01 RX ORDER — CHLORHEXIDINE GLUCONATE 213 G/1000ML
15 SOLUTION TOPICAL EVERY 12 HOURS
Refills: 0 | Status: DISCONTINUED | OUTPATIENT
Start: 2020-01-01 | End: 2020-01-01

## 2020-01-01 RX ORDER — TAMSULOSIN HYDROCHLORIDE 0.4 MG/1
0.4 CAPSULE ORAL
Qty: 90 | Refills: 3 | Status: ACTIVE | COMMUNITY
Start: 2020-01-01

## 2020-01-01 RX ORDER — LABETALOL HCL 100 MG
10 TABLET ORAL ONCE
Refills: 0 | Status: COMPLETED | OUTPATIENT
Start: 2020-01-01 | End: 2020-01-01

## 2020-01-01 RX ORDER — SODIUM BICARBONATE 1 MEQ/ML
50 SYRINGE (ML) INTRAVENOUS ONCE
Refills: 0 | Status: COMPLETED | OUTPATIENT
Start: 2020-01-01 | End: 2020-01-01

## 2020-01-01 RX ORDER — HYDRALAZINE HCL 50 MG
10 TABLET ORAL ONCE
Refills: 0 | Status: COMPLETED | OUTPATIENT
Start: 2020-01-01 | End: 2020-01-01

## 2020-01-01 RX ORDER — POTASSIUM CHLORIDE 20 MEQ
10 PACKET (EA) ORAL ONCE
Refills: 0 | Status: COMPLETED | OUTPATIENT
Start: 2020-01-01 | End: 2020-01-01

## 2020-01-01 RX ORDER — DESMOPRESSIN ACETATE 0.1 MG/1
20 TABLET ORAL ONCE
Refills: 0 | Status: COMPLETED | OUTPATIENT
Start: 2020-01-01 | End: 2020-01-01

## 2020-01-01 RX ORDER — EPINEPHRINE 0.3 MG/.3ML
0.05 INJECTION INTRAMUSCULAR; SUBCUTANEOUS
Qty: 8 | Refills: 0 | Status: DISCONTINUED | OUTPATIENT
Start: 2020-01-01 | End: 2020-01-01

## 2020-01-01 RX ORDER — PHYTONADIONE (VIT K1) 5 MG
10 TABLET ORAL ONCE
Refills: 0 | Status: COMPLETED | OUTPATIENT
Start: 2020-01-01 | End: 2020-01-01

## 2020-01-01 RX ORDER — NOREPINEPHRINE BITARTRATE/D5W 8 MG/250ML
0.05 PLASTIC BAG, INJECTION (ML) INTRAVENOUS
Qty: 16 | Refills: 0 | Status: DISCONTINUED | OUTPATIENT
Start: 2020-01-01 | End: 2020-01-01

## 2020-01-01 RX ORDER — SODIUM CHLORIDE 9 MG/ML
500 INJECTION, SOLUTION INTRAVENOUS ONCE
Refills: 0 | Status: COMPLETED | OUTPATIENT
Start: 2020-01-01 | End: 2020-01-01

## 2020-01-01 RX ORDER — TAMSULOSIN HYDROCHLORIDE 0.4 MG/1
0.4 CAPSULE ORAL AT BEDTIME
Refills: 0 | Status: DISCONTINUED | OUTPATIENT
Start: 2020-01-01 | End: 2020-01-01

## 2020-01-01 RX ORDER — CHLORHEXIDINE GLUCONATE 213 G/1000ML
1 SOLUTION TOPICAL ONCE
Refills: 0 | Status: COMPLETED | OUTPATIENT
Start: 2020-01-01 | End: 2020-01-01

## 2020-01-01 RX ORDER — POTASSIUM CHLORIDE 20 MEQ
20 PACKET (EA) ORAL ONCE
Refills: 0 | Status: COMPLETED | OUTPATIENT
Start: 2020-01-01 | End: 2020-01-01

## 2020-01-01 RX ORDER — VANCOMYCIN HCL 1 G
1500 VIAL (EA) INTRAVENOUS ONCE
Refills: 0 | Status: DISCONTINUED | OUTPATIENT
Start: 2020-01-01 | End: 2020-01-01

## 2020-01-01 RX ORDER — CHLORHEXIDINE GLUCONATE 213 G/1000ML
30 SOLUTION TOPICAL ONCE
Refills: 0 | Status: COMPLETED | OUTPATIENT
Start: 2020-01-01 | End: 2020-01-01

## 2020-01-01 RX ORDER — HEPARIN SODIUM 5000 [USP'U]/ML
5000 INJECTION INTRAVENOUS; SUBCUTANEOUS EVERY 8 HOURS
Refills: 0 | Status: DISCONTINUED | OUTPATIENT
Start: 2020-01-01 | End: 2020-01-01

## 2020-01-01 RX ORDER — ESMOLOL HCL 100MG/10ML
150 VIAL (ML) INTRAVENOUS
Qty: 2500 | Refills: 0 | Status: DISCONTINUED | OUTPATIENT
Start: 2020-01-01 | End: 2020-01-01

## 2020-01-01 RX ORDER — ALBUMIN HUMAN 25 %
250 VIAL (ML) INTRAVENOUS
Refills: 0 | Status: COMPLETED | OUTPATIENT
Start: 2020-01-01 | End: 2020-01-01

## 2020-01-01 RX ORDER — ALBUMIN HUMAN 25 %
50 VIAL (ML) INTRAVENOUS
Refills: 0 | Status: DISCONTINUED | OUTPATIENT
Start: 2020-01-01 | End: 2020-01-01

## 2020-01-01 RX ORDER — MAGNESIUM SULFATE 500 MG/ML
1 VIAL (ML) INJECTION ONCE
Refills: 0 | Status: COMPLETED | OUTPATIENT
Start: 2020-01-01 | End: 2020-01-01

## 2020-01-01 RX ORDER — BUPIVACAINE 13.3 MG/ML
20 INJECTION, SUSPENSION, LIPOSOMAL INFILTRATION ONCE
Refills: 0 | Status: DISCONTINUED | OUTPATIENT
Start: 2020-01-01 | End: 2020-01-01

## 2020-01-01 RX ORDER — DEXTROSE 50 % IN WATER 50 %
25 SYRINGE (ML) INTRAVENOUS ONCE
Refills: 0 | Status: DISCONTINUED | OUTPATIENT
Start: 2020-01-01 | End: 2020-01-01

## 2020-01-01 RX ORDER — MIDAZOLAM HYDROCHLORIDE 1 MG/ML
2 INJECTION, SOLUTION INTRAMUSCULAR; INTRAVENOUS ONCE
Refills: 0 | Status: DISCONTINUED | OUTPATIENT
Start: 2020-01-01 | End: 2020-01-01

## 2020-01-01 RX ORDER — PIPERACILLIN AND TAZOBACTAM 4; .5 G/20ML; G/20ML
4.5 INJECTION, POWDER, LYOPHILIZED, FOR SOLUTION INTRAVENOUS EVERY 6 HOURS
Refills: 0 | Status: DISCONTINUED | OUTPATIENT
Start: 2020-01-01 | End: 2020-01-01

## 2020-01-01 RX ORDER — VANCOMYCIN HCL 1 G
1250 VIAL (EA) INTRAVENOUS ONCE
Refills: 0 | Status: COMPLETED | OUTPATIENT
Start: 2020-01-01 | End: 2020-01-01

## 2020-01-01 RX ORDER — MIDAZOLAM HYDROCHLORIDE 1 MG/ML
4 INJECTION, SOLUTION INTRAMUSCULAR; INTRAVENOUS ONCE
Refills: 0 | Status: DISCONTINUED | OUTPATIENT
Start: 2020-01-01 | End: 2020-01-01

## 2020-01-01 RX ORDER — SODIUM CHLORIDE 9 MG/ML
1000 INJECTION, SOLUTION INTRAVENOUS
Refills: 0 | Status: DISCONTINUED | OUTPATIENT
Start: 2020-01-01 | End: 2020-01-01

## 2020-01-01 RX ORDER — MILRINONE LACTATE 1 MG/ML
1950 INJECTION, SOLUTION INTRAVENOUS ONCE
Refills: 0 | Status: COMPLETED | OUTPATIENT
Start: 2020-01-01 | End: 2020-01-01

## 2020-01-01 RX ORDER — FLUCONAZOLE 150 MG/1
400 TABLET ORAL EVERY 24 HOURS
Refills: 0 | Status: DISCONTINUED | OUTPATIENT
Start: 2020-01-01 | End: 2020-01-01

## 2020-01-01 RX ORDER — PIPERACILLIN AND TAZOBACTAM 4; .5 G/20ML; G/20ML
4.5 INJECTION, POWDER, LYOPHILIZED, FOR SOLUTION INTRAVENOUS ONCE
Refills: 0 | Status: COMPLETED | OUTPATIENT
Start: 2020-01-01 | End: 2020-01-01

## 2020-01-01 RX ORDER — CHLORHEXIDINE GLUCONATE 213 G/1000ML
1 SOLUTION TOPICAL
Refills: 0 | Status: DISCONTINUED | OUTPATIENT
Start: 2020-01-01 | End: 2020-01-01

## 2020-01-01 RX ORDER — VANCOMYCIN HCL 500 MG
5 VIAL (EA) INTRAVENOUS ONCE
Refills: 0 | Status: DISCONTINUED | OUTPATIENT
Start: 2020-01-01 | End: 2020-01-01

## 2020-01-01 RX ORDER — CHLORHEXIDINE GLUCONATE 213 G/1000ML
5 SOLUTION TOPICAL EVERY 4 HOURS
Refills: 0 | Status: DISCONTINUED | OUTPATIENT
Start: 2020-01-01 | End: 2020-01-01

## 2020-01-01 RX ORDER — PIPERACILLIN AND TAZOBACTAM 4; .5 G/20ML; G/20ML
2.25 INJECTION, POWDER, LYOPHILIZED, FOR SOLUTION INTRAVENOUS EVERY 6 HOURS
Refills: 0 | Status: DISCONTINUED | OUTPATIENT
Start: 2020-01-01 | End: 2020-01-01

## 2020-01-01 RX ORDER — VANCOMYCIN HCL 1 G
1000 VIAL (EA) INTRAVENOUS EVERY 12 HOURS
Refills: 0 | Status: DISCONTINUED | OUTPATIENT
Start: 2020-01-01 | End: 2020-01-01

## 2020-01-01 RX ORDER — PROPOFOL 10 MG/ML
32.47 INJECTION, EMULSION INTRAVENOUS
Qty: 1000 | Refills: 0 | Status: DISCONTINUED | OUTPATIENT
Start: 2020-01-01 | End: 2020-01-01

## 2020-01-01 RX ORDER — HYDRALAZINE HCL 50 MG
10 TABLET ORAL EVERY 6 HOURS
Refills: 0 | Status: DISCONTINUED | OUTPATIENT
Start: 2020-01-01 | End: 2020-01-01

## 2020-01-01 RX ORDER — CALCIUM CHLORIDE
1000 POWDER (GRAM) MISCELLANEOUS ONCE
Refills: 0 | Status: COMPLETED | OUTPATIENT
Start: 2020-01-01 | End: 2020-01-01

## 2020-01-01 RX ORDER — LOSARTAN POTASSIUM 100 MG/1
100 TABLET, FILM COATED ORAL DAILY
Refills: 0 | Status: DISCONTINUED | OUTPATIENT
Start: 2020-01-01 | End: 2020-01-01

## 2020-01-01 RX ORDER — SODIUM CHLORIDE 9 MG/ML
1000 INJECTION INTRAMUSCULAR; INTRAVENOUS; SUBCUTANEOUS
Refills: 0 | Status: DISCONTINUED | OUTPATIENT
Start: 2020-01-01 | End: 2020-01-01

## 2020-01-01 RX ORDER — MORPHINE SULFATE 50 MG/1
2 CAPSULE, EXTENDED RELEASE ORAL ONCE
Refills: 0 | Status: DISCONTINUED | OUTPATIENT
Start: 2020-01-01 | End: 2020-01-01

## 2020-01-01 RX ORDER — MEPERIDINE HYDROCHLORIDE 50 MG/ML
25 INJECTION INTRAMUSCULAR; INTRAVENOUS; SUBCUTANEOUS ONCE
Refills: 0 | Status: DISCONTINUED | OUTPATIENT
Start: 2020-01-01 | End: 2020-01-01

## 2020-01-01 RX ORDER — MILRINONE LACTATE 1 MG/ML
0.2 INJECTION, SOLUTION INTRAVENOUS
Qty: 20 | Refills: 0 | Status: DISCONTINUED | OUTPATIENT
Start: 2020-01-01 | End: 2020-01-01

## 2020-01-01 RX ORDER — ACETAMINOPHEN 500 MG
650 TABLET ORAL EVERY 6 HOURS
Refills: 0 | Status: DISCONTINUED | OUTPATIENT
Start: 2020-01-01 | End: 2020-01-01

## 2020-01-01 RX ORDER — METRONIDAZOLE 500 MG
TABLET ORAL
Refills: 0 | Status: DISCONTINUED | OUTPATIENT
Start: 2020-01-01 | End: 2020-01-01

## 2020-01-01 RX ORDER — POTASSIUM CHLORIDE 20 MEQ
40 PACKET (EA) ORAL ONCE
Refills: 0 | Status: COMPLETED | OUTPATIENT
Start: 2020-01-01 | End: 2020-01-01

## 2020-01-01 RX ORDER — DEXMEDETOMIDINE HYDROCHLORIDE IN 0.9% SODIUM CHLORIDE 4 UG/ML
0.5 INJECTION INTRAVENOUS
Qty: 200 | Refills: 0 | Status: DISCONTINUED | OUTPATIENT
Start: 2020-01-01 | End: 2020-01-01

## 2020-01-01 RX ORDER — POTASSIUM CHLORIDE 20 MEQ
40 PACKET (EA) ORAL EVERY 4 HOURS
Refills: 0 | Status: COMPLETED | OUTPATIENT
Start: 2020-01-01 | End: 2020-01-01

## 2020-01-01 RX ORDER — METHYLENE BLUE 65 MG
100 TABLET ORAL ONCE
Refills: 0 | Status: COMPLETED | OUTPATIENT
Start: 2020-01-01 | End: 2020-01-01

## 2020-01-01 RX ORDER — METOPROLOL SUCCINATE 50 MG/1
50 TABLET, EXTENDED RELEASE ORAL DAILY
Refills: 0 | Status: ACTIVE | COMMUNITY

## 2020-01-01 RX ORDER — VASOPRESSIN 20 [USP'U]/ML
0.05 INJECTION INTRAVENOUS
Qty: 50 | Refills: 0 | Status: DISCONTINUED | OUTPATIENT
Start: 2020-01-01 | End: 2020-01-01

## 2020-01-01 RX ORDER — PANTOPRAZOLE SODIUM 20 MG/1
40 TABLET, DELAYED RELEASE ORAL DAILY
Refills: 0 | Status: DISCONTINUED | OUTPATIENT
Start: 2020-01-01 | End: 2020-01-01

## 2020-01-01 RX ORDER — DEXTROSE 50 % IN WATER 50 %
25 SYRINGE (ML) INTRAVENOUS ONCE
Refills: 0 | Status: COMPLETED | OUTPATIENT
Start: 2020-01-01 | End: 2020-01-01

## 2020-01-01 RX ORDER — NIFEDIPINE 90 MG/1
90 TABLET, EXTENDED RELEASE ORAL DAILY
Qty: 30 | Refills: 3 | Status: ACTIVE | COMMUNITY

## 2020-01-01 RX ORDER — DEXTROSE 50 % IN WATER 50 %
12.5 SYRINGE (ML) INTRAVENOUS ONCE
Refills: 0 | Status: DISCONTINUED | OUTPATIENT
Start: 2020-01-01 | End: 2020-01-01

## 2020-01-01 RX ORDER — HYDROCORTISONE 20 MG
100 TABLET ORAL EVERY 8 HOURS
Refills: 0 | Status: DISCONTINUED | OUTPATIENT
Start: 2020-01-01 | End: 2020-01-01

## 2020-01-01 RX ORDER — INSULIN HUMAN 100 [IU]/ML
4 INJECTION, SOLUTION SUBCUTANEOUS
Qty: 100 | Refills: 0 | Status: DISCONTINUED | OUTPATIENT
Start: 2020-01-01 | End: 2020-01-01

## 2020-01-01 RX ORDER — FENTANYL CITRATE 50 UG/ML
0.1 INJECTION INTRAVENOUS
Qty: 2500 | Refills: 0 | Status: DISCONTINUED | OUTPATIENT
Start: 2020-01-01 | End: 2020-01-01

## 2020-01-01 RX ORDER — OXYCODONE HYDROCHLORIDE 5 MG/1
5 TABLET ORAL ONCE
Refills: 0 | Status: DISCONTINUED | OUTPATIENT
Start: 2020-01-01 | End: 2020-01-01

## 2020-01-01 RX ORDER — ACETAMINOPHEN 500 MG
1000 TABLET ORAL ONCE
Refills: 0 | Status: COMPLETED | OUTPATIENT
Start: 2020-01-01 | End: 2020-01-01

## 2020-01-01 RX ORDER — GLUCAGON INJECTION, SOLUTION 0.5 MG/.1ML
1 INJECTION, SOLUTION SUBCUTANEOUS ONCE
Refills: 0 | Status: DISCONTINUED | OUTPATIENT
Start: 2020-01-01 | End: 2020-01-01

## 2020-01-01 RX ORDER — PROTAMINE SULFATE 10 MG/ML
25 AMPUL (ML) INTRAVENOUS ONCE
Refills: 0 | Status: COMPLETED | OUTPATIENT
Start: 2020-01-01 | End: 2020-01-01

## 2020-01-01 RX ORDER — FLUCONAZOLE 150 MG/1
TABLET ORAL
Refills: 0 | Status: DISCONTINUED | OUTPATIENT
Start: 2020-01-01 | End: 2020-01-01

## 2020-01-01 RX ORDER — INSULIN LISPRO 100/ML
VIAL (ML) SUBCUTANEOUS
Refills: 0 | Status: DISCONTINUED | OUTPATIENT
Start: 2020-01-01 | End: 2020-01-01

## 2020-01-01 RX ORDER — FLUCONAZOLE 150 MG/1
400 TABLET ORAL ONCE
Refills: 0 | Status: COMPLETED | OUTPATIENT
Start: 2020-01-01 | End: 2020-01-01

## 2020-01-01 RX ORDER — HYDROCHLOROTHIAZIDE 25 MG
12.5 TABLET ORAL DAILY
Refills: 0 | Status: DISCONTINUED | OUTPATIENT
Start: 2020-01-01 | End: 2020-01-01

## 2020-01-01 RX ORDER — CEFAZOLIN SODIUM 1 G
2000 VIAL (EA) INJECTION EVERY 8 HOURS
Refills: 0 | Status: DISCONTINUED | OUTPATIENT
Start: 2020-01-01 | End: 2020-01-01

## 2020-01-01 RX ORDER — LOSARTAN POTASSIUM 50 MG/1
50 TABLET, FILM COATED ORAL DAILY
Qty: 30 | Refills: 0 | Status: ACTIVE | COMMUNITY

## 2020-01-01 RX ORDER — VANCOMYCIN HCL 1 G
1250 VIAL (EA) INTRAVENOUS EVERY 24 HOURS
Refills: 0 | Status: DISCONTINUED | OUTPATIENT
Start: 2020-01-01 | End: 2020-01-01

## 2020-01-01 RX ORDER — MAGNESIUM SULFATE 500 MG/ML
2 VIAL (ML) INJECTION ONCE
Refills: 0 | Status: COMPLETED | OUTPATIENT
Start: 2020-01-01 | End: 2020-01-01

## 2020-01-01 RX ORDER — CISATRACURIUM BESYLATE 2 MG/ML
3 INJECTION INTRAVENOUS
Qty: 200 | Refills: 0 | Status: DISCONTINUED | OUTPATIENT
Start: 2020-01-01 | End: 2020-01-01

## 2020-01-01 RX ORDER — SODIUM CHLORIDE 9 MG/ML
1000 INJECTION, SOLUTION INTRAVENOUS ONCE
Refills: 0 | Status: COMPLETED | OUTPATIENT
Start: 2020-01-01 | End: 2020-01-01

## 2020-01-01 RX ORDER — TAMSULOSIN HYDROCHLORIDE 0.4 MG/1
1 CAPSULE ORAL
Qty: 0 | Refills: 0 | DISCHARGE

## 2020-01-01 RX ORDER — DEXTROSE 50 % IN WATER 50 %
12.5 SYRINGE (ML) INTRAVENOUS ONCE
Refills: 0 | Status: COMPLETED | OUTPATIENT
Start: 2020-01-01 | End: 2020-01-01

## 2020-01-01 RX ORDER — FENTANYL CITRATE 50 UG/ML
0.5 INJECTION INTRAVENOUS
Qty: 2500 | Refills: 0 | Status: DISCONTINUED | OUTPATIENT
Start: 2020-01-01 | End: 2020-01-01

## 2020-01-01 RX ORDER — ANGIOTENSIN II 2.5 MG/ML
20 INJECTION INTRAVENOUS
Qty: 2.5 | Refills: 0 | Status: DISCONTINUED | OUTPATIENT
Start: 2020-01-01 | End: 2020-01-01

## 2020-01-01 RX ORDER — MAGNESIUM SULFATE 500 MG/ML
4 VIAL (ML) INJECTION ONCE
Refills: 0 | Status: COMPLETED | OUTPATIENT
Start: 2020-01-01 | End: 2020-01-01

## 2020-01-01 RX ORDER — MIRTAZAPINE 45 MG/1
1 TABLET, ORALLY DISINTEGRATING ORAL
Qty: 0 | Refills: 0 | DISCHARGE

## 2020-01-01 RX ORDER — POTASSIUM PHOSPHATE, MONOBASIC POTASSIUM PHOSPHATE, DIBASIC 236; 224 MG/ML; MG/ML
30 INJECTION, SOLUTION INTRAVENOUS ONCE
Refills: 0 | Status: COMPLETED | OUTPATIENT
Start: 2020-01-01 | End: 2020-01-01

## 2020-01-01 RX ADMIN — Medication 2: at 11:55

## 2020-01-01 RX ADMIN — Medication 100 GRAM(S): at 14:20

## 2020-01-01 RX ADMIN — Medication 10 MILLIGRAM(S): at 10:19

## 2020-01-01 RX ADMIN — Medication 125 MILLILITER(S): at 14:23

## 2020-01-01 RX ADMIN — CHLORHEXIDINE GLUCONATE 5 MILLILITER(S): 213 SOLUTION TOPICAL at 10:32

## 2020-01-01 RX ADMIN — MIDAZOLAM HYDROCHLORIDE 4 MILLIGRAM(S): 1 INJECTION, SOLUTION INTRAMUSCULAR; INTRAVENOUS at 15:10

## 2020-01-01 RX ADMIN — Medication 50 MILLIEQUIVALENT(S): at 08:05

## 2020-01-01 RX ADMIN — Medication 600 MICROGRAM(S): at 14:12

## 2020-01-01 RX ADMIN — FENTANYL CITRATE 25 MICROGRAM(S): 50 INJECTION INTRAVENOUS at 15:40

## 2020-01-01 RX ADMIN — MORPHINE SULFATE 2 MILLIGRAM(S): 50 CAPSULE, EXTENDED RELEASE ORAL at 20:27

## 2020-01-01 RX ADMIN — FENTANYL CITRATE 25 MICROGRAM(S): 50 INJECTION INTRAVENOUS at 15:54

## 2020-01-01 RX ADMIN — CHLORHEXIDINE GLUCONATE 5 MILLILITER(S): 213 SOLUTION TOPICAL at 14:25

## 2020-01-01 RX ADMIN — Medication 100 MILLIGRAM(S): at 22:51

## 2020-01-01 RX ADMIN — Medication 1000 MILLIGRAM(S): at 22:00

## 2020-01-01 RX ADMIN — TAMSULOSIN HYDROCHLORIDE 0.4 MILLIGRAM(S): 0.4 CAPSULE ORAL at 21:24

## 2020-01-01 RX ADMIN — MORPHINE SULFATE 4 MILLIGRAM(S): 50 CAPSULE, EXTENDED RELEASE ORAL at 14:38

## 2020-01-01 RX ADMIN — Medication 125 MILLILITER(S): at 12:22

## 2020-01-01 RX ADMIN — PANTOPRAZOLE SODIUM 40 MILLIGRAM(S): 20 TABLET, DELAYED RELEASE ORAL at 06:58

## 2020-01-01 RX ADMIN — Medication 10 MILLIGRAM(S): at 16:30

## 2020-01-01 RX ADMIN — Medication 125 MILLILITER(S): at 05:35

## 2020-01-01 RX ADMIN — Medication 69.3 MICROGRAM(S)/KG/MIN: at 16:38

## 2020-01-01 RX ADMIN — SODIUM CHLORIDE 3 MILLILITER(S): 9 INJECTION INTRAMUSCULAR; INTRAVENOUS; SUBCUTANEOUS at 06:00

## 2020-01-01 RX ADMIN — Medication 40 MILLIEQUIVALENT(S): at 14:22

## 2020-01-01 RX ADMIN — Medication 1000 MILLIGRAM(S): at 16:30

## 2020-01-01 RX ADMIN — CISATRACURIUM BESYLATE 10 MILLIGRAM(S): 2 INJECTION INTRAVENOUS at 07:00

## 2020-01-01 RX ADMIN — Medication 100 MILLIGRAM(S): at 06:05

## 2020-01-01 RX ADMIN — FENTANYL CITRATE 25 MICROGRAM(S): 50 INJECTION INTRAVENOUS at 22:22

## 2020-01-01 RX ADMIN — CHLORHEXIDINE GLUCONATE 1 APPLICATION(S): 213 SOLUTION TOPICAL at 21:33

## 2020-01-01 RX ADMIN — FLUCONAZOLE 100 MILLIGRAM(S): 150 TABLET ORAL at 10:34

## 2020-01-01 RX ADMIN — Medication 10 MILLIGRAM(S): at 13:55

## 2020-01-01 RX ADMIN — Medication 125 MILLILITER(S): at 15:23

## 2020-01-01 RX ADMIN — SODIUM CHLORIDE 3 MILLILITER(S): 9 INJECTION INTRAMUSCULAR; INTRAVENOUS; SUBCUTANEOUS at 21:34

## 2020-01-01 RX ADMIN — Medication 69.3 MICROGRAM(S)/KG/MIN: at 14:34

## 2020-01-01 RX ADMIN — Medication 50 MILLIEQUIVALENT(S): at 06:53

## 2020-01-01 RX ADMIN — SODIUM CHLORIDE 100 MILLILITER(S): 9 INJECTION, SOLUTION INTRAVENOUS at 11:59

## 2020-01-01 RX ADMIN — Medication 69.3 MICROGRAM(S)/KG/MIN: at 21:00

## 2020-01-01 RX ADMIN — FENTANYL CITRATE 25 MICROGRAM(S): 50 INJECTION INTRAVENOUS at 10:20

## 2020-01-01 RX ADMIN — Medication 25 MILLILITER(S): at 12:40

## 2020-01-01 RX ADMIN — PANTOPRAZOLE SODIUM 40 MILLIGRAM(S): 20 TABLET, DELAYED RELEASE ORAL at 11:20

## 2020-01-01 RX ADMIN — MIRTAZAPINE 15 MILLIGRAM(S): 45 TABLET, ORALLY DISINTEGRATING ORAL at 11:55

## 2020-01-01 RX ADMIN — FLUCONAZOLE 100 MILLIGRAM(S): 150 TABLET ORAL at 14:28

## 2020-01-01 RX ADMIN — Medication 125 MILLILITER(S): at 15:34

## 2020-01-01 RX ADMIN — Medication 10 MILLIGRAM(S): at 07:14

## 2020-01-01 RX ADMIN — Medication 650 MILLIGRAM(S): at 10:31

## 2020-01-01 RX ADMIN — PANTOPRAZOLE SODIUM 40 MILLIGRAM(S): 20 TABLET, DELAYED RELEASE ORAL at 14:14

## 2020-01-01 RX ADMIN — MILRINONE LACTATE 4.62 MICROGRAM(S)/KG/MIN: 1 INJECTION, SOLUTION INTRAVENOUS at 06:56

## 2020-01-01 RX ADMIN — FENTANYL CITRATE 25 MICROGRAM(S): 50 INJECTION INTRAVENOUS at 10:45

## 2020-01-01 RX ADMIN — Medication 650 MILLIGRAM(S): at 19:25

## 2020-01-01 RX ADMIN — CHLORHEXIDINE GLUCONATE 1 APPLICATION(S): 213 SOLUTION TOPICAL at 06:06

## 2020-01-01 RX ADMIN — Medication 69.3 MICROGRAM(S)/KG/MIN: at 12:22

## 2020-01-01 RX ADMIN — SODIUM CHLORIDE 1000 MILLILITER(S): 9 INJECTION, SOLUTION INTRAVENOUS at 09:46

## 2020-01-01 RX ADMIN — MORPHINE SULFATE 2 MILLIGRAM(S): 50 CAPSULE, EXTENDED RELEASE ORAL at 21:00

## 2020-01-01 RX ADMIN — PIPERACILLIN AND TAZOBACTAM 25 GRAM(S): 4; .5 INJECTION, POWDER, LYOPHILIZED, FOR SOLUTION INTRAVENOUS at 17:22

## 2020-01-01 RX ADMIN — Medication 10 MILLIGRAM(S): at 17:50

## 2020-01-01 RX ADMIN — Medication 102 MILLIGRAM(S): at 00:53

## 2020-01-01 RX ADMIN — Medication 50 MILLIGRAM(S): at 06:36

## 2020-01-01 RX ADMIN — PIPERACILLIN AND TAZOBACTAM 25 GRAM(S): 4; .5 INJECTION, POWDER, LYOPHILIZED, FOR SOLUTION INTRAVENOUS at 23:58

## 2020-01-01 RX ADMIN — Medication 23.1 MICROGRAM(S)/KG/MIN: at 16:30

## 2020-01-01 RX ADMIN — HEPARIN SODIUM 5000 UNIT(S): 5000 INJECTION INTRAVENOUS; SUBCUTANEOUS at 21:24

## 2020-01-01 RX ADMIN — SODIUM CHLORIDE 3 MILLILITER(S): 9 INJECTION INTRAMUSCULAR; INTRAVENOUS; SUBCUTANEOUS at 13:25

## 2020-01-01 RX ADMIN — Medication 100 MILLIGRAM(S): at 12:37

## 2020-01-01 RX ADMIN — Medication 125 MILLILITER(S): at 03:10

## 2020-01-01 RX ADMIN — FENTANYL CITRATE 25 MICROGRAM(S): 50 INJECTION INTRAVENOUS at 12:15

## 2020-01-01 RX ADMIN — OXYCODONE HYDROCHLORIDE 5 MILLIGRAM(S): 5 TABLET ORAL at 23:00

## 2020-01-01 RX ADMIN — CISATRACURIUM BESYLATE 10 MILLIGRAM(S): 2 INJECTION INTRAVENOUS at 08:05

## 2020-01-01 RX ADMIN — FENTANYL CITRATE 25 MICROGRAM(S): 50 INJECTION INTRAVENOUS at 15:45

## 2020-01-01 RX ADMIN — Medication 166.67 MILLIGRAM(S): at 14:12

## 2020-01-01 RX ADMIN — Medication 100 MILLIGRAM(S): at 11:20

## 2020-01-01 RX ADMIN — FENTANYL CITRATE 25 MICROGRAM(S): 50 INJECTION INTRAVENOUS at 23:00

## 2020-01-01 RX ADMIN — Medication 10 MILLIGRAM(S): at 19:12

## 2020-01-01 RX ADMIN — Medication 50 MILLILITER(S): at 08:06

## 2020-01-01 RX ADMIN — Medication 210 MILLIGRAM(S): at 19:51

## 2020-01-01 RX ADMIN — NICARDIPINE HYDROCHLORIDE 25 MG/HR: 30 CAPSULE, EXTENDED RELEASE ORAL at 19:00

## 2020-01-01 RX ADMIN — Medication 100 MILLIGRAM(S): at 22:50

## 2020-01-01 RX ADMIN — Medication 100 MILLIGRAM(S): at 01:08

## 2020-01-01 RX ADMIN — SODIUM CHLORIDE 1000 MILLILITER(S): 9 INJECTION, SOLUTION INTRAVENOUS at 14:31

## 2020-01-01 RX ADMIN — DESMOPRESSIN ACETATE 220 MICROGRAM(S): 0.1 TABLET ORAL at 21:14

## 2020-01-01 RX ADMIN — Medication 650 MILLIGRAM(S): at 10:45

## 2020-01-01 RX ADMIN — PROPOFOL 15 MICROGRAM(S)/KG/MIN: 10 INJECTION, EMULSION INTRAVENOUS at 00:27

## 2020-01-01 RX ADMIN — CISATRACURIUM BESYLATE 10 MILLIGRAM(S): 2 INJECTION INTRAVENOUS at 12:45

## 2020-01-01 RX ADMIN — POTASSIUM PHOSPHATE, MONOBASIC POTASSIUM PHOSPHATE, DIBASIC 83.33 MILLIMOLE(S): 236; 224 INJECTION, SOLUTION INTRAVENOUS at 06:00

## 2020-01-01 RX ADMIN — Medication 40 MILLIEQUIVALENT(S): at 18:23

## 2020-01-01 RX ADMIN — Medication 12.5 MILLILITER(S): at 11:16

## 2020-01-01 RX ADMIN — CHLORHEXIDINE GLUCONATE 15 MILLILITER(S): 213 SOLUTION TOPICAL at 06:29

## 2020-01-01 RX ADMIN — SODIUM CHLORIDE 3 MILLILITER(S): 9 INJECTION INTRAMUSCULAR; INTRAVENOUS; SUBCUTANEOUS at 21:42

## 2020-01-01 RX ADMIN — SODIUM CHLORIDE 3 MILLILITER(S): 9 INJECTION INTRAMUSCULAR; INTRAVENOUS; SUBCUTANEOUS at 22:09

## 2020-01-01 RX ADMIN — Medication 20 MILLIGRAM(S): at 17:30

## 2020-01-01 RX ADMIN — FENTANYL CITRATE 25 MICROGRAM(S): 50 INJECTION INTRAVENOUS at 15:25

## 2020-01-01 RX ADMIN — Medication 125 MILLILITER(S): at 13:22

## 2020-01-01 RX ADMIN — PIPERACILLIN AND TAZOBACTAM 25 GRAM(S): 4; .5 INJECTION, POWDER, LYOPHILIZED, FOR SOLUTION INTRAVENOUS at 06:59

## 2020-01-01 RX ADMIN — PANTOPRAZOLE SODIUM 40 MILLIGRAM(S): 20 TABLET, DELAYED RELEASE ORAL at 05:51

## 2020-01-01 RX ADMIN — PROPOFOL 15 MICROGRAM(S)/KG/MIN: 10 INJECTION, EMULSION INTRAVENOUS at 23:25

## 2020-01-01 RX ADMIN — SODIUM CHLORIDE 3 MILLILITER(S): 9 INJECTION INTRAMUSCULAR; INTRAVENOUS; SUBCUTANEOUS at 05:35

## 2020-01-01 RX ADMIN — Medication 102 MILLIGRAM(S): at 21:43

## 2020-01-01 RX ADMIN — MIRTAZAPINE 15 MILLIGRAM(S): 45 TABLET, ORALLY DISINTEGRATING ORAL at 14:21

## 2020-01-01 RX ADMIN — Medication 0.1 MILLIGRAM(S): at 15:23

## 2020-01-01 RX ADMIN — Medication 20 MILLIEQUIVALENT(S): at 04:41

## 2020-01-01 RX ADMIN — Medication 400 MILLIGRAM(S): at 15:19

## 2020-01-01 RX ADMIN — CISATRACURIUM BESYLATE 13.9 MICROGRAM(S)/KG/MIN: 2 INJECTION INTRAVENOUS at 14:38

## 2020-01-01 RX ADMIN — TAMSULOSIN HYDROCHLORIDE 0.4 MILLIGRAM(S): 0.4 CAPSULE ORAL at 21:33

## 2020-01-01 RX ADMIN — MORPHINE SULFATE 4 MILLIGRAM(S): 50 CAPSULE, EXTENDED RELEASE ORAL at 17:27

## 2020-01-01 RX ADMIN — Medication 10 MILLIGRAM(S): at 15:19

## 2020-01-01 RX ADMIN — FENTANYL CITRATE 25 MICROGRAM(S): 50 INJECTION INTRAVENOUS at 15:05

## 2020-01-01 RX ADMIN — Medication 10 MILLIGRAM(S): at 09:00

## 2020-01-01 RX ADMIN — VASOPRESSIN 3 UNIT(S)/MIN: 20 INJECTION INTRAVENOUS at 23:25

## 2020-01-01 RX ADMIN — FENTANYL CITRATE 25 MICROGRAM(S): 50 INJECTION INTRAVENOUS at 11:56

## 2020-01-01 RX ADMIN — Medication 40 MILLIEQUIVALENT(S): at 16:03

## 2020-01-01 RX ADMIN — PIPERACILLIN AND TAZOBACTAM 25 GRAM(S): 4; .5 INJECTION, POWDER, LYOPHILIZED, FOR SOLUTION INTRAVENOUS at 09:45

## 2020-01-01 RX ADMIN — HEPARIN SODIUM 5000 UNIT(S): 5000 INJECTION INTRAVENOUS; SUBCUTANEOUS at 14:22

## 2020-01-01 RX ADMIN — Medication 40 MILLIEQUIVALENT(S): at 02:18

## 2020-01-01 RX ADMIN — NICARDIPINE HYDROCHLORIDE 25 MG/HR: 30 CAPSULE, EXTENDED RELEASE ORAL at 21:00

## 2020-01-01 RX ADMIN — FENTANYL CITRATE 0.77 MICROGRAM(S)/KG/HR: 50 INJECTION INTRAVENOUS at 14:38

## 2020-01-01 RX ADMIN — HEPARIN SODIUM 5000 UNIT(S): 5000 INJECTION INTRAVENOUS; SUBCUTANEOUS at 06:58

## 2020-01-01 RX ADMIN — Medication 50 GRAM(S): at 12:35

## 2020-01-01 RX ADMIN — Medication 10 MILLIGRAM(S): at 19:58

## 2020-01-01 RX ADMIN — CHLORHEXIDINE GLUCONATE 5 MILLILITER(S): 213 SOLUTION TOPICAL at 06:06

## 2020-01-01 RX ADMIN — CHLORHEXIDINE GLUCONATE 5 MILLILITER(S): 213 SOLUTION TOPICAL at 22:49

## 2020-01-01 RX ADMIN — Medication 100 MILLIGRAM(S): at 14:13

## 2020-01-01 RX ADMIN — SODIUM CHLORIDE 3 MILLILITER(S): 9 INJECTION INTRAMUSCULAR; INTRAVENOUS; SUBCUTANEOUS at 14:26

## 2020-01-01 RX ADMIN — Medication 650 MILLIGRAM(S): at 20:30

## 2020-01-01 RX ADMIN — FENTANYL CITRATE 25 MICROGRAM(S): 50 INJECTION INTRAVENOUS at 10:18

## 2020-01-01 RX ADMIN — TAMSULOSIN HYDROCHLORIDE 0.4 MILLIGRAM(S): 0.4 CAPSULE ORAL at 22:22

## 2020-01-01 RX ADMIN — FENTANYL CITRATE 25 MICROGRAM(S): 50 INJECTION INTRAVENOUS at 10:00

## 2020-01-01 RX ADMIN — SODIUM CHLORIDE 3 MILLILITER(S): 9 INJECTION INTRAMUSCULAR; INTRAVENOUS; SUBCUTANEOUS at 06:58

## 2020-01-01 RX ADMIN — Medication 69.3 MICROGRAM(S)/KG/MIN: at 14:27

## 2020-01-01 RX ADMIN — OXYCODONE HYDROCHLORIDE 5 MILLIGRAM(S): 5 TABLET ORAL at 21:24

## 2020-07-29 NOTE — CHART NOTE - NSCHARTNOTEFT_GEN_A_CORE
Patient presents with abdominal pain for 24 hours. CTA revealed an extent II thoracoabdominal aneurysm. Has a history of previous TEVAR.   Given the presentation and the CT findings, patient is in need of an open repair of his aneurysm. Extensive discussion was carried out with the patient, explaining that a life saving operation is required to treat this condition. He is refusing treatment at this time and wishes to be discharged in order to seek a second opinion.   Patient is aware and understands the risks involved, including rupture and death.     Case discussed with Dr. Lewis  ER attending aware and patient will be discharge per his wishes      Raf Brar MD  Vascular Surgery Fellow

## 2020-07-29 NOTE — ED PROVIDER NOTE - OBJECTIVE STATEMENT
69 yo M with pmhx of HTN, aortic dissection, s/p thoracic aortic aneurysm repair presenting with crampy, non-radiating, intermittent 8/10 mid-abdominal pain since yesterday. Symptoms are moderate. No alleviating/aggravating factors. No cp, sob, fever, chills, nausea, vomiting, diarrhea, back pain, urinary symptoms, headache, dizziness,  paresthesias, or weakness.

## 2020-07-29 NOTE — ED PROVIDER NOTE - ATTENDING CONTRIBUTION TO CARE
69 y/o male with h/o htn, s/p thoracic aortic aneurysm repair (> 10 yrs ago), in ER with c/o abd pain which started last night.  pain to mid abdomen, crampy, waxes and wanes.  no n/v/d.  lbm - yesterday, normal.  no back pain.  no urinary symptoms.  no cp/sob.  no ha/dizziness/syncope/near syncope.  no recent travel. no f/c.  PE - nad, nc/at, eomi, perrl, op - clear, cta b/l, no w/r/r, rrr, abd- soft, minimally tender to mid abdomen, no guarding/rebound, nabs, from x 4, no LE swelling/tenderness, A&O x 3, no focal neuro deficits.  -check labs, ua, ct abd, re-eval. 71 y/o male with h/o htn, s/p thoracic aortic aneurysm repair, chronic type B aortic dissection, in ER with c/o abd pain which started last night.  pain to mid abdomen, crampy, waxes and wanes.  no n/v/d.  lbm - yesterday, normal.  no back pain.  no urinary symptoms.  no cp/sob.  no ha/dizziness/syncope/near syncope. no motor weakness/paresthesias. no recent travel. no f/c.  PE - nad, nc/at, eomi, perrl, op - clear, cta b/l, no w/r/r, rrr, abd- soft, minimally tender to mid abdomen, no guarding/rebound, nabs, from x 4, no LE swelling/tenderness, A&O x 3, no focal neuro deficits.  -check labs, ua, ct abd, re-eval. 69 y/o male with h/o htn, s/p thoracic aortic aneurysm repair, chronic type B aortic dissection, in ER with c/o abd pain which started last night.  pain to mid abdomen, crampy, waxes and wanes.  no n/v/d.  lbm - yesterday, normal.  no back pain.  no urinary symptoms.  no cp/sob.  no ha/dizziness/syncope/near syncope. no motor weakness/paresthesias. no recent travel. no f/c.  PE - nad, nc/at, eomi, perrl, op - clear, cta b/l, no w/r/r, rrr, abd- soft, minimally tender to mid abdomen, no guarding/rebound, nabs, 2+ femoral pulses b/l, from x 4, no LE swelling/tenderness, A&O x 3, no focal neuro deficits.  -check labs, ua, cta chest/abd, re-eval.

## 2020-07-29 NOTE — ED PROVIDER NOTE - PATIENT PORTAL LINK FT
You can access the FollowMyHealth Patient Portal offered by [facility] by registering at the following website: [email or web address]. By joining [facility]’s FollowMyHealth portal, you will also be able to view your health information using other applications (apps) compatible with our system.

## 2020-07-29 NOTE — ED PROVIDER NOTE - PHYSICAL EXAMINATION
VITAL SIGNS: I have reviewed nursing notes and confirm.  CONSTITUTIONAL: Well-developed; well-nourished; in no acute distress.  SKIN: Skin exam is warm and dry, no acute rash.  HEAD: Normocephalic; atraumatic.  EYES: PERRL, EOM intact; conjunctiva and sclera clear.  ENT: No nasal discharge; airway clear.   NECK: Supple; non tender.  CARD: S1, S2 normal; no murmurs, gallops, or rubs. Regular rate and rhythm.  RESP: Clear to auscultation bilaterally. No wheezes, rales or rhonchi.  ABD: Normal bowel sounds; soft; non-distended; +mid abdominal tenderness. No rebound tenderness or guarding.   EXT: Normal ROM. No edema.  LYMPH: No acute cervical adenopathy.  NEURO: Alert, oriented. Grossly unremarkable. No focal deficits.  PSYCH: Cooperative, appropriate.

## 2020-07-29 NOTE — ED PROVIDER NOTE - CARE PROVIDER_API CALL
Madi Lewis  Vascular Surgery  [address] [place] [id number]  Phone: [phone]  Fax: [phone]  Follow Up Time: Urgent

## 2020-07-29 NOTE — ED PROVIDER NOTE - PROGRESS NOTE DETAILS
Vascular consulted regarding increase in abdominal aorta. CTA  - stable thoracic findings, abdominal aorta at diaphragm hiatus increased in size from 7.3 cm to 8.7 cm, no evidence of rupture.  Seen by vascular fellow, plan for OR tomorrow, however pt not sure he wants to stay.  concerned about making this decision right now.  repeat /112, pt given dose of iv labetalol.  pt will d/w his family. CTA  - stable thoracic findings, abdominal aorta at diaphragm hiatus increased in size from 7.3 cm to 8.7 cm, at level of SMA 6.3 cm > 8.7 cm, at renal artery 5.9 > 7.0 cm  no evidence of rupture.  Seen by vascular fellow, plan for OR tomorrow, however pt not sure he wants to stay.  concerned about making this decision right now, wants to get a second opinion.  repeat /112, pt given dose of iv labetalol.  pt will d/w his family. Pt with persistently elevated BP, given 2 doses of IV labetalol.  Pt continuing to refuse surgery, wants to get a second opinion from a doctor he has previously seen at [facility].  Had extensive discussion with pt and risks of leaving, pt told of high likelihood of aneurysm rupture and death - he is aware, but insists he must get a second opinion and that if he dies 'it's in god's hand'  Case d/w pt's son as well, he is aware of pt's decision.  Pt is A&O x 3, no intoxication.  Pt told he can return to ER at any time if he changes his mind, pt told to return to an ER immediately for any worsening of pain, nausea, vomiting, dizziness, diaphoresis, sob, cp.  Pt understands - given copy of labs and CT report to d/w his doctor.  to s/o AMA. Pt with persistently elevated BP, given 2 doses of IV labetalol.  Pt continuing to refuse surgery, wants to get a second opinion from a doctor he has previously seen at [facility].  Had extensive discussion with pt about risks of leaving, pt told of high likelihood of aneurysm rupture and death - he is aware, but insists he must get a second opinion and that if he dies 'it's in god's hand'  Case d/w pt's son as well, he is aware of pt's decision.  Pt is A&O x 3, no intoxication.  Pt told he can return to ER at any time if he changes his mind, pt told to return to an ER immediately for any worsening of pain, nausea, vomiting, dizziness, diaphoresis, sob, cp.  Pt understands - given copy of labs and CT report to d/w his doctor.  to s/o AMA. I had extensive discussion of risks and benefits of pursuing further medical evaluation and/or care with patient and any available family/friends; patient still electing to leave against medical advice. Patient is awake, alert, oriented and demonstrates full capacity and insight into illness. Patient aware and encouraged to return immediately to ED or nearest ED if patient decides to change mind regarding care or if patient experiences any new, worsening, or concerning symptoms.

## 2020-07-29 NOTE — ED ADULT NURSE NOTE - CHPI ED NUR SYMPTOMS NEG
no abdominal distension/no dysuria/no diarrhea/no vomiting/no nausea/no hematuria/no chills/no burning urination/no blood in stool/no fever

## 2020-07-29 NOTE — ED PROVIDER NOTE - NS ED ROS FT
Review of Systems:  	•	CONSTITUTIONAL - no fever, no diaphoresis, no chills  	•	SKIN - no rash  	•	HEMATOLOGIC - no bleeding, no bruising  	•	EYES - no eye pain, no blurry vision  	•	ENT - no congestion  	•	RESPIRATORY - no shortness of breath, no cough  	•	CARDIAC - no chest pain, no palpitations  	•	GI - + abd pain, no nausea, no vomiting, no diarrhea, no constipation  	•	GENITO-URINARY - no dysuria; no hematuria, no increased urinary frequency  	•	MUSCULOSKELETAL - no joint paint, no swelling, no redness  	•	NEUROLOGIC - no weakness, no headache, no paresthesias, no LOC  	•	PSYCH - no anxiety, no depression  	All other ROS are negative except as documented in HPI.

## 2020-07-31 PROBLEM — I71.5 RUPTURED ANEURYSM OF THORACOABDOMINAL AORTA: Status: RESOLVED | Noted: 2020-01-01 | Resolved: 2020-01-01

## 2020-07-31 PROBLEM — Z87.891 FORMER SMOKER: Status: ACTIVE | Noted: 2020-01-01

## 2020-07-31 PROBLEM — I71.2 THORACIC AORTIC ANEURYSM: Status: ACTIVE | Noted: 2017-09-12

## 2020-07-31 PROBLEM — I71.6 THORACOABDOMINAL AORTIC ANEURYSM (TAAA): Status: ACTIVE | Noted: 2020-01-01

## 2020-07-31 NOTE — ED ADULT NURSE REASSESSMENT NOTE - NS ED NURSE REASSESS COMMENT FT1
Pending CT surgery to see patient, patient to remain on CCM at this time. no further orders at this time.

## 2020-07-31 NOTE — ED ADULT TRIAGE NOTE - ARRIVAL INFO ADDITIONAL COMMENTS
sent by CTS for aortiis aneurysim. pt states he was scheduled for surgery on tuesday but does not want to stay in the [facility]. denies any chest pain, sob, back pain or any other medical complaints.

## 2020-07-31 NOTE — ED ADULT NURSE NOTE - OBJECTIVE STATEMENT
71 yo M pmhx dissection sent from [facility] for dissection and pending surgery. Pt is Aox3, speaking clearly and coherently, placed on CCM, pulse ox, bilateral blood pressures taken upon arrival. Pt denies CP, SOB, does endorse abdominal pain x 3 days. PIV placed, labs collected. pending CT surgery to bedside. denies shulder pain, neck pain , back pain, headache, dizziness.

## 2020-07-31 NOTE — ED PROVIDER NOTE - OBJECTIVE STATEMENT
70 year old male presents to ED from CT surgical suite for COVID/pre operative testing.  CT surgery PA notes patient with known aortic aneurysm and requires COVID testing prior to admission for surgical repair.  Patient is non toxic in appearance on arrival to ED.  He denies chest pain, shortness of breath, fever, chills, headache, abdominal pain, nausea, emesis, changes to bowel movements, peripheral edema or any additional acute complaints or concerns at this time.

## 2020-07-31 NOTE — H&P ADULT - NSHPPHYSICALEXAM_GEN_ALL_CORE
General: Lying comfortably in bed, NAD    HEENT: NC/AT, neck supple, MMM     Neurological: A&O x3, no focal deficits    Cardiovascular: RRR, normal s1 s2, no M/R/G    Respiratory: Non-labored breathing, chest expansion symmetric, CTA b/l, no W/R/R    Gastrointestinal: +BS x4 quadrant, soft, mildly and diffusely tender to palpation, non-distended    Extremities: WWP, no pitting edema, no calf tenderness    Vascular: peripheral pulses palpable

## 2020-07-31 NOTE — H&P ADULT - NSHPSOCIALHISTORY_GEN_ALL_CORE
Former smoker 3/4 cigarettes per day x 10 years, quir 2 years ago  Denies etoh, denies illicit drug use

## 2020-07-31 NOTE — H&P ADULT - ASSESSMENT
69yo M former smoker with PMHx of HTN, prostate Ca s/p radiation, aortic dissection s/p endovascular repair (2011 by Dr. Madi Lewis) who recently presented to [facility] with 8/10 cramping, non-radiating abdominal pain. CT scan done at that time demonstrated expanding descending aortic aneurysm from 7.3cm to 8.7cm at the level of the diaphragm, 6.3cm to 8.7cm at the level of the SMA and from 539cm to 7.0cm at the level of the renal arteries. Additional a new opacifies false lumen is noted anteriorly within aneurysm. Pt was encouraged to stay at [facility] for further treatment but left AMA to seek second opinion. He presented to Dr. Willson's office today 7/31 for evaluation and was referred to the ED for admission, blood pressure control and pre-surgical work up. In the ED afebrile, HR 67, /100, SpO2 97% on RA. H&H stable, WBC WNL, Cr elevated to 1.87, LFTs WNL. Pt admitted to CT surgery 9 east for strict BP control and pre-surgical work up. Pt currently endorsing continued abdominal cramping and intermittent back pain. Denies CP, SOB, N/V, LE weakness. Pt admitted to CTICU for closer monitoring and PST with plan for OR monday for re-op Open TAAA repair.     Neuro: Pain well controlled with current regimen  - No delirium, mentating well  - Tylenol 650 PRN for mild pain   - F/u CT head  - F/u carotid duplex   - F/u lumbar XR  - Pre-op lumbar drain placement   - Depression: Continue mirtazapine 15mg daily     Respiratory SpO2 97% on RA   - CXR showing prior stent graft, no acute infiltrates, no ptx, no pleural effusion appreciated  - COVID negative  - F/u PFTs   - Encourage ambulation C+DB and use of IS 10x / hr while awake    Cardiac: TAAA  - Hx of aortic dissection s/p TEVAR (2011) now with expanding TAAA as described  - Continue strict BP control. Continue metoprolol 100mg daily, losartan 100mg daily, hydrochlorothiazide 12.5mg daily   - Given hydralazine 10mg IVP in ED  - Esmolol drip serafin pt in CTICU  - TTE: EF 75%, no significant valvulopathy, small pericardial effusion   - Pt in NSR  - Continue to monitor hr/bp/tele     GI: no active issues  - DIET: DASH consistent carb diet  - GI PPX with protonix     Renal/: BUN/Cr 23/1.87  - Hypokalemia 3.3, treating with 40meq K q4h for 2 doses   - Monitor renal function  - Monitor I/Os  - Replete K<4.0, Mg<2.0    Heme: H&H 41/43  - H&H stable, continue to monitor   - Pt has active T&S  - DVT ppx with 5000u SQH q8h, SCDs b/l    Endocrinology   - hgA1C 6.0, continue ISS  - TSH WNL      ID: afebrile WBC 10.9  - F/u UA   - Observe for SIRS/Sepsis Syndrome    Dispo: OR Monday

## 2020-07-31 NOTE — PROCEDURE NOTE - NSPROCDETAILS_GEN_ALL_CORE
positive blood return obtained via catheter/sutured in place/all materials/supplies accounted for at end of procedure/location identified, draped/prepped, sterile technique used, needle inserted/introduced/connected to a pressurized flush line/hemostasis with direct pressure, dressing applied/ultrasound guidance

## 2020-07-31 NOTE — PHYSICAL EXAM
[General Appearance - Alert] : alert [General Appearance - In No Acute Distress] : in no acute distress [Outer Ear] : the ears and nose were normal in appearance [Neck Appearance] : the appearance of the neck was normal [Sclera] : the sclera and conjunctiva were normal [Apical Impulse] : the apical impulse was normal [] : no respiratory distress [Examination Of The Chest] : the chest was normal in appearance [2+] : left 2+ [Abdomen Soft] : soft [Abnormal Walk] : normal gait [Skin Color & Pigmentation] : normal skin color and pigmentation [Cranial Nerves] : cranial nerves 2-12 were intact [Oriented To Time, Place, And Person] : oriented to person, place, and time

## 2020-07-31 NOTE — H&P ADULT - HISTORY OF PRESENT ILLNESS
71yo M former smoker with PMHx of HTN, prostate Ca s/p radiation, aortic dissection s/p endovascular repair (2011 by Dr. Madi Lewis) who recently presented to [facility] with 8/10 cramping, non-radiating abdominal pain. CT scan done at that time demonstrated expanding descending aortic aneurysm from 7.3cm to 8.7cm at the level of the diaphragm, 6.3cm to 8.7cm at the level of the SMA and from 539cm to 7.0cm at the level of the renal arteries. Additional a new opacifies false lumen is noted anteriorly within aneurysm. Pt was encouraged to stay at [facility] for further treatment but left AMA to seek second opinion. He presented to Dr. Willson's office today 7/31 for evaluation and was referred to the ED for admission, blood pressure control and pre-surgical work up. In the ED afebrile, HR 67, /100, SpO2 97% on RA. H&H stable, WBC WNL, Cr elevated to 1.87, LFTs WNL. Pt admitted to CT surgery 9 east for strict BP control and pre-surgical work up. Pt currently endorsing continued abdominal cramping and intermittent back pain. Denies CP, SOB, N/V, LE weakness

## 2020-07-31 NOTE — H&P ADULT - NSHPREVIEWOFSYSTEMS_GEN_ALL_CORE
Review of Systems  CONSTITUTIONAL:  Denies Fevers / chills, sweats, fatigue, weight loss, weight gain                                      NEURO:  Denies parathesias, seizures, syncope, confusion                                                                                EYES:  Denies Blurry vision, discharge, pain, loss of vision                                                                                    ENMT:  Denies Difficulty hearing, vertigo, dysphagia, epistaxis, recent dental work                                       CV:  Denies Chest pain, palpitations, KEY, orthopnea                                                                                          RESPIRATORY:  Denies Wheezing, SOB, cough / sputum, hemoptysis                                                                GI:  cramping abdominal pain, Denies Nausea, vommiting, diarrhea, constipation, melena, difficulty swallowing                                               : Denies Hematuria, dysuria, urgency, incontinence                                                                                         MUSKULOSKELETAL:  intermittent back pain Denies arthritis, joint swelling, muscle weakness                                                             SKIN/BREAST:  Denies rash, itching, darnell loss, masses                                                                                            PSYCH:  Denies depresion, anxiety, suicidal ideation                                                                                               HEME/LYMPH:  Denies bruises easily, enlarged lymph nodes, tender lymph nodes                                        ENDOCRINE:  Denies cold intolerance, heat intolerance, polydipsia

## 2020-07-31 NOTE — PROGRESS NOTE ADULT - SUBJECTIVE AND OBJECTIVE BOX
CTICU  CRITICAL  CARE  attending     Hand off received 					   Pertinent clinical, laboratory, radiographic, hemodynamic, echocardiographic, respiratory data, microbiologic data and chart were reviewed and analyzed frequently throughout the course of the day and night  Patient seen and examined with CTS/ SH attending at bedside  Pt is a 70y , Male, HEALTH ISSUES - PROBLEM Dx:      , FAMILY HISTORY:  PAST MEDICAL & SURGICAL HISTORY:  Prostate cancer  History of aortic dissection  Hypertension  Status post endoscopic repair of thoracic aortic aneurysm (TAA)    Patient is a 70y old  Male who presents with a chief complaint of Thoracoabdominal aneurysm (31 Jul 2020 15:00)      14 system review was unremarkable    Vital signs, hemodynamic and respiratory parameters were reviewed from the bedside nursing flowsheet.  ICU Vital Signs Last 24 Hrs  T(C): 36.9 (31 Jul 2020 17:28), Max: 36.9 (31 Jul 2020 17:28)  T(F): 98.4 (31 Jul 2020 17:28), Max: 98.4 (31 Jul 2020 17:28)  HR: 68 (31 Jul 2020 16:30) (53 - 70)  BP: 172/92 (31 Jul 2020 16:30) (139/77 - 191/100)  BP(mean): 125 (31 Jul 2020 16:30) (125 - 125)  ABP: --  ABP(mean): --  RR: 16 (31 Jul 2020 16:30) (16 - 96)  SpO2: 96% (31 Jul 2020 16:30) (96% - 97%)    Adult Advanced Hemodynamics Last 24 Hrs  CVP(mm Hg): --  CVP(cm H2O): --  CO: --  CI: --  PA: --  PA(mean): --  PCWP: --  SVR: --  SVRI: --  PVR: --  PVRI: --,     Intake and output was reviewed and the fluid balance was calculated  Daily Height in cm: 177.8 (31 Jul 2020 12:52)    Daily   I&O's Summary      All lines and drain sites were assessed  Glycemic trend was reviewedCAPILLARY BLOOD GLUCOSE        No acute change in mental status  Auscultation of the chest reveals equal bs  Abdomen is soft  Extremities are warm and well perfused  Wounds appear clean and unremarkable  Antibiotics are periop    labs  CBC Full  -  ( 31 Jul 2020 13:27 )  WBC Count : 10.97 K/uL  RBC Count : 5.25 M/uL  Hemoglobin : 14.3 g/dL  Hematocrit : 43.6 %  Platelet Count - Automated : 258 K/uL  Mean Cell Volume : 83.0 fl  Mean Cell Hemoglobin : 27.2 pg  Mean Cell Hemoglobin Concentration : 32.8 gm/dL  Auto Neutrophil # : 8.00 K/uL  Auto Lymphocyte # : 1.49 K/uL  Auto Monocyte # : 1.33 K/uL  Auto Eosinophil # : 0.09 K/uL  Auto Basophil # : 0.03 K/uL  Auto Neutrophil % : 72.9 %  Auto Lymphocyte % : 13.6 %  Auto Monocyte % : 12.1 %  Auto Eosinophil % : 0.8 %  Auto Basophil % : 0.3 %    07-31    142  |  94<L>  |  23  ----------------------------<  141<H>  3.3<L>   |  35<H>  |  1.87<H>    Ca    10.2      31 Jul 2020 13:27  Mg     2.4     07-31    TPro  9.0<H>  /  Alb  4.5  /  TBili  0.8  /  DBili  x   /  AST  14  /  ALT  10  /  AlkPhos  114  07-31    PT/INR - ( 31 Jul 2020 13:27 )   PT: 14.2 sec;   INR: 1.19          PTT - ( 31 Jul 2020 13:27 )  PTT:40.6 sec  The current medications were reviewed   MEDICATIONS  (STANDING):  dextrose 5%. 1000 milliLiter(s) (50 mL/Hr) IV Continuous <Continuous>  dextrose 50% Injectable 12.5 Gram(s) IV Push once  dextrose 50% Injectable 25 Gram(s) IV Push once  dextrose 50% Injectable 25 Gram(s) IV Push once  esMOLOL  Infusion 50 MICROgram(s)/kG/Min (23.1 mL/Hr) IV Continuous <Continuous>  heparin   Injectable 5000 Unit(s) SubCutaneous every 8 hours  insulin lispro (HumaLOG) corrective regimen sliding scale   SubCutaneous Before meals and at bedtime  mirtazapine 15 milliGRAM(s) Oral daily  pantoprazole    Tablet 40 milliGRAM(s) Oral before breakfast  potassium chloride    Tablet ER 40 milliEquivalent(s) Oral every 4 hours  sodium chloride 0.9% lock flush 3 milliLiter(s) IV Push every 8 hours  tamsulosin 0.4 milliGRAM(s) Oral at bedtime    MEDICATIONS  (PRN):  acetaminophen   Tablet .. 650 milliGRAM(s) Oral every 6 hours PRN Mild Pain (1 - 3)  dextrose 40% Gel 15 Gram(s) Oral once PRN Blood Glucose LESS THAN 70 milliGRAM(s)/deciliter  glucagon  Injectable 1 milliGRAM(s) IntraMuscular once PRN Glucose LESS THAN 70 milligrams/deciliter       PROBLEM LIST/ ASSESSMENT:  HEALTH ISSUES - PROBLEM Dx:      ,   Patient is a 70y old  Male who presents with a chief complaint of Thoracoabdominal aneurysm (31 Jul 2020 15:00)                 My plan includes :  close hemodynamic, ventilatory and drain monitoring and management per post op routine    Monitor for arrhythmias and monitor parameters for organ perfusion  beta blockade not administered due to hemodynamic instability and bradycardia  monitor neurologic status  Head of the bed should remain elevated to 45 deg .   chest PT and IS will be encouraged  monitor adequacy of oxygenation and ventilation and attempt to wean oxygen  antibiotic regimen will be tailored to the clinical, laboratory and microbiologic data  Nutritional goals will be met using po eventually , ensure adequate caloric intake and montior the same  Stress ulcer and VTE prophylaxis will be achieved    Glycemic control is satisfactory  Electrolytes have been repleted as necessary and wound care has been carried out. Pain control has been achieved.   agressive physical therapy and early mobility and ambulation goals will be met   The family was updated about the course and plan  CRITICAL CARE TIME personally provided by me  in evaluation and management, reassessments, review and interpretation of labs and x-rays, ventilator and hemodynamic management, formulating a plan and coordinating care: ___90____ MIN.  Time does not include procedural time. Time spent was non routine post-operarive caRE and included multiple and repeated evaluations at the bedside  CTICU ATTENDING     					    Alok Bledose MD

## 2020-07-31 NOTE — ED PROVIDER NOTE - CLINICAL SUMMARY MEDICAL DECISION MAKING FREE TEXT BOX
Patient presents to ED for COVID/pre op testing for planned aortic aneurysm repair.  Dr. Willson's PA called ED to request COVID testing and admission to Dr. Willson, telemetry.  Patient aware of plan and in agreement.  Patient stable at time of ED disposition.

## 2020-08-01 NOTE — PROGRESS NOTE ADULT - SUBJECTIVE AND OBJECTIVE BOX
CTICU  CRITICAL  CARE  attending     Hand off received 					   Pertinent clinical, laboratory, radiographic, hemodynamic, echocardiographic, respiratory data, microbiologic data and chart were reviewed and analyzed frequently throughout the course of the day and night  Patient seen and examined with CTS/ SH attending at bedside  Pt is a 70y , Male, HEALTH ISSUES - PROBLEM Dx:      , FAMILY HISTORY:  PAST MEDICAL & SURGICAL HISTORY:  Prostate cancer  History of aortic dissection  Hypertension  Essential hypertension  Hypertension  Status post endoscopic repair of thoracic aortic aneurysm (TAA)    Patient is a 70y old  Male who presents with a chief complaint of Thoracoabdominal aneurysm (31 Jul 2020 23:44)      14 system review was unremarkable    Vital signs, hemodynamic and respiratory parameters were reviewed from the bedside nursing flowsheet.  ICU Vital Signs Last 24 Hrs  T(C): 36.7 (01 Aug 2020 10:01), Max: 37.3 (31 Jul 2020 21:15)  T(F): 98.1 (01 Aug 2020 10:01), Max: 99.1 (31 Jul 2020 21:15)  HR: 67 (01 Aug 2020 11:00) (53 - 76)  BP: 121/63 (01 Aug 2020 01:10) (107/62 - 191/100)  BP(mean): 85 (01 Aug 2020 01:10) (79 - 125)  ABP: 120/71 (01 Aug 2020 11:00) (101/65 - 145/68)  ABP(mean): 91 (01 Aug 2020 11:00) (58 - 96)  RR: 18 (01 Aug 2020 11:00) (14 - 96)  SpO2: 96% (01 Aug 2020 11:00) (94% - 99%)    Adult Advanced Hemodynamics Last 24 Hrs  CVP(mm Hg): --  CVP(cm H2O): --  CO: --  CI: --  PA: --  PA(mean): --  PCWP: --  SVR: --  SVRI: --  PVR: --  PVRI: --, ABG - ( 01 Aug 2020 02:35 )  pH, Arterial: 7.50  pH, Blood: x     /  pCO2: 36    /  pO2: 76    / HCO3: 27    / Base Excess: 4.3   /  SaO2: 95                  Intake and output was reviewed and the fluid balance was calculated  Daily Height in cm: 177.8 (31 Jul 2020 12:52)    Daily   I&O's Summary    31 Jul 2020 07:01  -  01 Aug 2020 07:00  --------------------------------------------------------  IN: 852 mL / OUT: 460 mL / NET: 392 mL    01 Aug 2020 07:01  -  01 Aug 2020 12:38  --------------------------------------------------------  IN: 1713.9 mL / OUT: 410 mL / NET: 1303.9 mL        All lines and drain sites were assessed  [facility]      POCT Blood Glucose.: 131 mg/dL (01 Aug 2020 12:17)    No acute change in mental status  Auscultation of the chest reveals equal bs  Abdomen is soft  Extremities are warm and well perfused  Wounds appear clean and unremarkable  Antibiotics are periop    labs  CBC Full  -  ( 01 Aug 2020 02:44 )  WBC Count : 8.86 K/uL  RBC Count : 4.35 M/uL  Hemoglobin : 12.0 g/dL  Hematocrit : 35.4 %  Platelet Count - Automated : 220 K/uL  Mean Cell Volume : 81.4 fl  Mean Cell Hemoglobin : 27.6 pg  Mean Cell Hemoglobin Concentration : 33.9 gm/dL  Auto Neutrophil # : x  Auto Lymphocyte # : x  Auto Monocyte # : x  Auto Eosinophil # : x  Auto Basophil # : x  Auto Neutrophil % : x  Auto Lymphocyte % : x  Auto Monocyte % : x  Auto Eosinophil % : x  Auto Basophil % : x    08-01    138  |  98  |  30<H>  ----------------------------<  113<H>  3.3<L>   |  27  |  1.91<H>    Ca    9.2      01 Aug 2020 02:44  Phos  3.3     08-01  Mg     2.2     08-01    TPro  7.1  /  Alb  3.4  /  TBili  0.5  /  DBili  x   /  AST  12  /  ALT  7<L>  /  AlkPhos  89  08-01    PT/INR - ( 01 Aug 2020 02:44 )   PT: 14.6 sec;   INR: 1.23          PTT - ( 01 Aug 2020 02:44 )  PTT:35.2 sec  The current medications were reviewed   MEDICATIONS  (STANDING):  dextrose 5%. 1000 milliLiter(s) (50 mL/Hr) IV Continuous <Continuous>  dextrose 50% Injectable 12.5 Gram(s) IV Push once  dextrose 50% Injectable 25 Gram(s) IV Push once  dextrose 50% Injectable 25 Gram(s) IV Push once  esMOLOL  Infusion 150 MICROgram(s)/kG/Min (69.3 mL/Hr) IV Continuous <Continuous>  fentaNYL    Injectable 25 MICROGram(s) IV Push once  heparin   Injectable 5000 Unit(s) SubCutaneous every 8 hours  insulin lispro (HumaLOG) corrective regimen sliding scale   SubCutaneous Before meals and at bedtime  lactated ringers. 1000 milliLiter(s) (75 mL/Hr) IV Continuous <Continuous>  mirtazapine 15 milliGRAM(s) Oral daily  niCARdipine Infusion 5 mG/Hr (25 mL/Hr) IV Continuous <Continuous>  pantoprazole    Tablet 40 milliGRAM(s) Oral before breakfast  sodium chloride 0.9% lock flush 3 milliLiter(s) IV Push every 8 hours  tamsulosin 0.4 milliGRAM(s) Oral at bedtime    MEDICATIONS  (PRN):  acetaminophen   Tablet .. 650 milliGRAM(s) Oral every 6 hours PRN Mild Pain (1 - 3)  dextrose 40% Gel 15 Gram(s) Oral once PRN Blood Glucose LESS THAN 70 milliGRAM(s)/deciliter  glucagon  Injectable 1 milliGRAM(s) IntraMuscular once PRN Glucose LESS THAN 70 milligrams/deciliter       PROBLEM LIST/ ASSESSMENT:  HEALTH ISSUES - PROBLEM Dx:      ,   Patient is a 70y old  Male who presents with a chief complaint of Thoracoabdominal aneurysm (31 Jul 2020 23:44)                     My plan includes :  close hemodynamic, ventilatory and drain monitoring and management per post op routine    Monitor for arrhythmias and monitor parameters for organ perfusion  beta blockade not administered due to hemodynamic instability and bradycardia  monitor neurologic status  Head of the bed should remain elevated to 45 deg .   chest PT and IS will be encouraged  monitor adequacy of oxygenation and ventilation and attempt to wean oxygen  antibiotic regimen will be tailored to the clinical, laboratory and microbiologic data  Nutritional goals will be met using po eventually , ensure adequate caloric intake and montior the same  Stress ulcer and VTE prophylaxis will be achieved    Glycemic control is satisfactory  Electrolytes have been repleted as necessary and wound care has been carried out. Pain control has been achieved.   agressive physical therapy and early mobility and ambulation goals will be met   The family was updated about the course and plan  CRITICAL CARE TIME personally provided by me  in evaluation and management, reassessments, review and interpretation of labs and x-rays, ventilator and hemodynamic management, formulating a plan and coordinating care: ___90____ MIN.  Time does not include procedural time. Time spent was non routine post-operarive caRE and included multiple and repeated evaluations at the bedside  CTICU ATTENDING     					    Alok Bledsoe MD

## 2020-08-01 NOTE — PROGRESS NOTE ADULT - SUBJECTIVE AND OBJECTIVE BOX
CTICU  CRITICAL  CARE  attending     Hand off received 					   Pertinent clinical, laboratory, radiographic, hemodynamic, echocardiographic, respiratory data, microbiologic data and chart were reviewed and analyzed frequently throughout the course of the day and night    71yo Male with HTN, CA prostate S/P  radiation, aortic dissection s/p endovascular repair (2011 by Dr. Madi Lewis).  He recently presented to [facility] with 8/10 cramping, non-radiating abdominal pain.   CT scan done at that time demonstrated expanding descending aortic aneurysm from 7.3cm to 8.7cm at the level of the diaphragm, 6.3cm to 8.7cm at the level of the SMA and from 5.9cm to 7.0cm at the level of the renal arteries. Additional a new opacifies false lumen is noted anteriorly within aneurysm.   The patient was encouraged to stay at [facility] for further treatment but he left AMA to seek second opinion.   He presented to Dr. Willson's office today 7/31 for evaluation and was referred to the ED for admission, blood pressure control and pre-surgical work up.   In the Emergency Room he was afebrile, HR 67, /100, SpO2 97% on RA. H&H stable, WBC WNL, Cr elevated to 1.87, LFTs WNL.   The Patient was  admitted to CTICU  for close monitoring and  strict BP control and pre-surgical work up.   Pt currently endorsing continued abdominal cramping and intermittent back pain. Denies CP, SOB, N/V, LE weakness      FAMILY HISTORY:  PAST MEDICAL & SURGICAL HISTORY:  Prostate cancer  History of aortic dissection  Hypertension  Essential hypertension  Hypertension  Status post endoscopic repair of thoracic aortic aneurysm (TAA)        14 system review was unremarkable    Vital signs, hemodynamic and respiratory parameters were reviewed from the bedside nursing flow sheet.  ICU Vital Signs Last 24 Hrs  T(C): 37.2 (01 Aug 2020 21:03), Max: 37.2 (01 Aug 2020 21:03)  T(F): 99 (01 Aug 2020 21:03), Max: 99 (01 Aug 2020 21:03)  HR: 84 (01 Aug 2020 22:00) (57 - 85)  BP: 167/92 (01 Aug 2020 20:00) (94/56 - 178/88)  BP(mean): 123 (01 Aug 2020 20:00) (70 - 123)  ABP: 124/52 (01 Aug 2020 22:00) (93/47 - 179/81)  ABP(mean): 74 (01 Aug 2020 22:00) (61 - 148)  RR: 17 (01 Aug 2020 22:00) (14 - 22)  SpO2: 95% (01 Aug 2020 22:00) (94% - 99%)    Adult Advanced Hemodynamics Last 24 Hrs  CVP(mm Hg): --  CVP(cm H2O): --  CO: --  CI: --  PA: --  PA(mean): --  PCWP: --  SVR: --  SVRI: --  PVR: --  PVRI: --, ABG - ( 01 Aug 2020 13:36 )  pH, Arterial: 7.52  pH, Blood: x     /  pCO2: 35    /  pO2: 83    / HCO3: 28    / Base Excess: 5.1   /  SaO2: 96                  Intake and output was reviewed and the fluid balance was calculated  Daily     Daily   I&O's Summary    31 Jul 2020 07:01  -  01 Aug 2020 07:00  --------------------------------------------------------  IN: 852 mL / OUT: 460 mL / NET: 392 mL    01 Aug 2020 07:01  -  01 Aug 2020 23:14  --------------------------------------------------------  IN: 3258 mL / OUT: 1400 mL / NET: 1858 mL        All lines and drain sites were assessed      Neuro: Follows commands. Moves all 4 extremities.  Neck: No JVD.  CVS: S1, S1, No S3.  Lungs: Good air entry bilaterally.  Abd: Soft. No tenderness. + Bowel sounds.  Vascular: + DP/PT.  Extremities: No edema.  Lymphatic: Normal.  Skin: No abnormalities.      labs  CBC Full  -  ( 01 Aug 2020 13:36 )  WBC Count : 8.17 K/uL  RBC Count : 4.27 M/uL  Hemoglobin : 11.8 g/dL  Hematocrit : 35.0 %  Platelet Count - Automated : 226 K/uL  Mean Cell Volume : 82.0 fl  Mean Cell Hemoglobin : 27.6 pg  Mean Cell Hemoglobin Concentration : 33.7 gm/dL  Auto Neutrophil # : x  Auto Lymphocyte # : x  Auto Monocyte # : x  Auto Eosinophil # : x  Auto Basophil # : x  Auto Neutrophil % : x  Auto Lymphocyte % : x  Auto Monocyte % : x  Auto Eosinophil % : x  Auto Basophil % : x    08-01    140  |  103  |  24<H>  ----------------------------<  121<H>  3.5   |  27  |  1.33<H>    Ca    8.7      01 Aug 2020 13:36  Phos  2.2     08-01  Mg     2.2     08-01    TPro  6.7  /  Alb  3.3  /  TBili  0.5  /  DBili  x   /  AST  10  /  ALT  7<L>  /  AlkPhos  82  08-01    PT/INR - ( 01 Aug 2020 13:36 )   PT: 13.6 sec;   INR: 1.14          PTT - ( 01 Aug 2020 13:36 )  PTT:32.2 sec  The current medications were reviewed   MEDICATIONS  (STANDING):  dexMEDEtomidine Infusion 0.5 MICROgram(s)/kG/Hr (9.63 mL/Hr) IV Continuous <Continuous>  dextrose 5%. 1000 milliLiter(s) (50 mL/Hr) IV Continuous <Continuous>  dextrose 50% Injectable 12.5 Gram(s) IV Push once  dextrose 50% Injectable 25 Gram(s) IV Push once  dextrose 50% Injectable 25 Gram(s) IV Push once  esMOLOL  Infusion 150 MICROgram(s)/kG/Min (69.3 mL/Hr) IV Continuous <Continuous>  insulin lispro (HumaLOG) corrective regimen sliding scale   SubCutaneous Before meals and at bedtime  lactated ringers. 1000 milliLiter(s) (75 mL/Hr) IV Continuous <Continuous>  mirtazapine 15 milliGRAM(s) Oral daily  niCARdipine Infusion 5 mG/Hr (25 mL/Hr) IV Continuous <Continuous>  pantoprazole    Tablet 40 milliGRAM(s) Oral before breakfast  sodium chloride 0.9% lock flush 3 milliLiter(s) IV Push every 8 hours  tamsulosin 0.4 milliGRAM(s) Oral at bedtime    MEDICATIONS  (PRN):  acetaminophen   Tablet .. 650 milliGRAM(s) Oral every 6 hours PRN Mild Pain (1 - 3)  dextrose 40% Gel 15 Gram(s) Oral once PRN Blood Glucose LESS THAN 70 milliGRAM(s)/deciliter  glucagon  Injectable 1 milliGRAM(s) IntraMuscular once PRN Glucose LESS THAN 70 milligrams/deciliter          Patient is a 70y old  Male who presents with Thoracoabdominal aneurysm.  Current aortic maximum diameters measurements as follows:  Sinuses of Valsalva: 3.5 cm  - stable  Ascending aorta: 3.7 cm  - stable  Distal aortic arch: 4.5 cm (proximal to stent) - stable  Descending aorta: 3.6 cm (stented lumen - stable), 6.5 cm (stented and excluded lumen together) - stable  Aorta at the diaphragmatic hiatus: 4.7 cm (stented lumen- stable), 8.7 cm  (stented and excluded lumen together), the excluded lumen has increased in diameter, previously 7.3.  Uncontrolled HTN.  Good oxygenation.  Fair urine out put.  Overall doing well.      My plan includes :  IV Esmolol.  IV hydralazine for after load reduction.  Eventually Po Statin and Betablocker.  Close hemodynamic, ventilatory and drain monitoring and management  Monitor for arrhythmias and monitor parameters for organ perfusion  Monitor neurologic status  Monitor renal function.  Head of the bed should remain elevated to 45 deg .   Chest PT and IS will be encouraged  Monitor adequacy of oxygenation and ventilation and attempt to wean oxygen  Nutritional goals will be met using po eventually , ensure adequate caloric intake and monitor the same  Stress ulcer and VTE prophylaxis will be achieved    Glycemic control is satisfactory  Electrolytes have been repleted as necessary and wound care has been carried out. Pain control has been achieved.   Aggressive physical therapy and early mobility and ambulation goals will be met   The family was updated about the course and plan  CRITICAL CARE TIME SPENT in evaluation and management, reassessments, review and interpretation of labs and x-rays, ventilator and hemodynamic management, formulating a plan and coordinating care: ___30____ MIN.  Time does not include procedural time.  CTICU ATTENDING     					    Kaiden Cotter MD

## 2020-08-02 NOTE — PROGRESS NOTE ADULT - SUBJECTIVE AND OBJECTIVE BOX
CTICU  CRITICAL  CARE  attending     Hand off received 					   Pertinent clinical, laboratory, radiographic, hemodynamic, echocardiographic, respiratory data, microbiologic data and chart were reviewed and analyzed frequently throughout the course of the day and night    69yo Male with HTN, CA prostate S/P  radiation, aortic dissection s/p endovascular repair (2011 by Dr. Madi Lewis).  He recently presented to [facility] with 8/10 cramping, non-radiating abdominal pain.   CT scan done at that time demonstrated expanding descending aortic aneurysm from 7.3cm to 8.7cm at the level of the diaphragm, 6.3cm to 8.7cm at the level of the SMA and from 5.9cm to 7.0cm at the level of the renal arteries. Additional a new opacifies false lumen is noted anteriorly within aneurysm.   The patient was encouraged to stay at [facility] for further treatment but he left AMA to seek second opinion.   He presented to Dr. Willson's office today 7/31 for evaluation and was referred to the ED for admission, blood pressure control and pre-surgical work up.   In the Emergency Room he was afebrile, HR 67, /100, SpO2 97% on RA. H&H stable, WBC WNL, Cr elevated to 1.87, LFTs WNL.   The Patient was  admitted to CTICU  for close monitoring and  strict BP control and pre-surgical work up.   S/P  Lumbar drain placement.      FAMILY HISTORY:  PAST MEDICAL & SURGICAL HISTORY:  Prostate cancer  History of aortic dissection  Hypertension  Essential hypertension  Hypertension  Status post endoscopic repair of thoracic aortic aneurysm (TAA)      14 system review was unremarkable      Vital signs, hemodynamic and respiratory parameters were reviewed from the bedside nursing flow sheet.  ICU Vital Signs Last 24 Hrs  T(C): 37.6 (02 Aug 2020 20:23), Max: 37.6 (02 Aug 2020 01:01)  T(F): 99.6 (02 Aug 2020 20:23), Max: 99.6 (02 Aug 2020 01:01)  HR: 66 (02 Aug 2020 22:06) (65 - 87)  BP: --  BP(mean): --  ABP: 111/55 (02 Aug 2020 22:00) (103/64 - 178/87)  ABP(mean): 72 (02 Aug 2020 22:00) (72 - 122)  RR: 16 (02 Aug 2020 22:06) (15 - 20)  SpO2: 97% (02 Aug 2020 22:06) (92% - 99%)    Adult Advanced Hemodynamics Last 24 Hrs  CVP(mm Hg): --  CVP(cm H2O): --  CO: --  CI: --  PA: --  PA(mean): --  PCWP: --  SVR: --  SVRI: --  PVR: --  PVRI: --, ABG - ( 02 Aug 2020 11:46 )  pH, Arterial: 7.49  pH, Blood: x     /  pCO2: 31    /  pO2: 73    / HCO3: 24    / Base Excess: 0.7   /  SaO2: 95                  Intake and output was reviewed and the fluid balance was calculated  Daily Height in cm: 177.8 (02 Aug 2020 22:06)    Daily   I&O's Summary    01 Aug 2020 07:01  -  02 Aug 2020 07:00  --------------------------------------------------------  IN: 3757 mL / OUT: 2155 mL / NET: 1602 mL    02 Aug 2020 07:01  -  02 Aug 2020 22:49  --------------------------------------------------------  IN: 3115.8 mL / OUT: 2165 mL / NET: 950.8 mL        All lines and drain sites were assessed    Neuro: No change in the neurologic status.  Neck: No JVD.  CVS: S1, S2, No S3.  Lungs: Good air entry bilaterally.  Abd: Soft. No tenderness. + Bowel sounds.  Vascular: + DP/PT.  Extremities: No edema.  Lymphatic: Normal.  Skin: No abnormalities.      labs  CBC Full  -  ( 02 Aug 2020 11:45 )  WBC Count : 7.84 K/uL  RBC Count : 4.14 M/uL  Hemoglobin : 11.6 g/dL  Hematocrit : 34.2 %  Platelet Count - Automated : 215 K/uL  Mean Cell Volume : 82.6 fl  Mean Cell Hemoglobin : 28.0 pg  Mean Cell Hemoglobin Concentration : 33.9 gm/dL  Auto Neutrophil # : x  Auto Lymphocyte # : x  Auto Monocyte # : x  Auto Eosinophil # : x  Auto Basophil # : x  Auto Neutrophil % : x  Auto Lymphocyte % : x  Auto Monocyte % : x  Auto Eosinophil % : x  Auto Basophil % : x    08-02    142  |  108  |  15  ----------------------------<  172<H>  4.0   |  23  |  1.10    Ca    8.8      02 Aug 2020 11:45  Phos  2.1     08-02  Mg     2.0     08-02    TPro  6.7  /  Alb  3.3  /  TBili  0.5  /  DBili  x   /  AST  10  /  ALT  7<L>  /  AlkPhos  82  08-01    PT/INR - ( 02 Aug 2020 11:45 )   PT: 14.2 sec;   INR: 1.19          PTT - ( 02 Aug 2020 11:45 )  PTT:30.5 sec  The current medications were reviewed   MEDICATIONS  (STANDING):  chlorhexidine 0.12% Liquid 30 milliLiter(s) Swish and Spit once  dexMEDEtomidine Infusion 0.5 MICROgram(s)/kG/Hr (9.63 mL/Hr) IV Continuous <Continuous>  dextrose 5%. 1000 milliLiter(s) (50 mL/Hr) IV Continuous <Continuous>  dextrose 50% Injectable 12.5 Gram(s) IV Push once  dextrose 50% Injectable 25 Gram(s) IV Push once  dextrose 50% Injectable 25 Gram(s) IV Push once  esMOLOL  Infusion 150 MICROgram(s)/kG/Min (69.3 mL/Hr) IV Continuous <Continuous>  insulin lispro (HumaLOG) corrective regimen sliding scale   SubCutaneous Before meals and at bedtime  lactated ringers. 1000 milliLiter(s) (75 mL/Hr) IV Continuous <Continuous>  midazolam Injectable 2 milliGRAM(s) IV Push once  mirtazapine 15 milliGRAM(s) Oral daily  niCARdipine Infusion 5 mG/Hr (25 mL/Hr) IV Continuous <Continuous>  pantoprazole    Tablet 40 milliGRAM(s) Oral before breakfast  sodium chloride 0.9% lock flush 3 milliLiter(s) IV Push every 8 hours  tamsulosin 0.4 milliGRAM(s) Oral at bedtime    MEDICATIONS  (PRN):  acetaminophen   Tablet .. 650 milliGRAM(s) Oral every 6 hours PRN Mild Pain (1 - 3)  dextrose 40% Gel 15 Gram(s) Oral once PRN Blood Glucose LESS THAN 70 milliGRAM(s)/deciliter  glucagon  Injectable 1 milliGRAM(s) IntraMuscular once PRN Glucose LESS THAN 70 milligrams/deciliter  hydrALAZINE Injectable 10 milliGRAM(s) IV Push every 6 hours PRN SBP > 130        Patient is a 70y old  Male who presents with Thoracoabdominal aneurysm.  Current aortic maximum diameters measurements as follows:  Sinuses of Valsalva: 3.5 cm  - stable  Ascending aorta: 3.7 cm  - stable  Distal aortic arch: 4.5 cm (proximal to stent) - stable  Descending aorta: 3.6 cm (stented lumen - stable), 6.5 cm (stented and excluded lumen together) - stable  Aorta at the diaphragmatic hiatus: 4.7 cm (stented lumen- stable), 8.7 cm  (stented and excluded lumen together), the excluded lumen has increased in diameter, previously 7.3.  Uncontrolled HTN.  Good oxygenation.  Fair urine out put.  For OR tomorrow.      My plan includes :  IV betablocker  IV calcium channel blocker.  Statin and Betablocker.  Close hemodynamic, ventilatory and drain monitoring and management  Monitor for arrhythmias and monitor parameters for organ perfusion  Monitor neurologic status  Monitor renal function.  Head of the bed should remain elevated to 45 deg .   Chest PT and IS will be encouraged  Monitor adequacy of oxygenation and ventilation and attempt to wean oxygen  Nutritional goals will be met using po eventually , ensure adequate caloric intake and monitor the same  Stress ulcer and VTE prophylaxis will be achieved    OPTIMIZE Glycemic control.  Electrolytes have been repleted as necessary and wound care has been carried out. Pain control has been achieved.   Aggressive physical therapy and early mobility and ambulation goals will be met   The family was updated about the course and plan  CRITICAL CARE TIME SPENT in evaluation and management, reassessments, review and interpretation of labs and x-rays, ventilator and hemodynamic management, formulating a plan and coordinating care: ___30____ MIN.  Time does not include procedural time.  CTICU ATTENDING     					    Kaiden Cotter MD

## 2020-08-02 NOTE — END OF VISIT
[FreeTextEntry3] : I, ROSARIO DEXTER , am scribing for and in the presence of VESNA THOMAS the following sections: History of present illness, past Medical/family/surgical/family/social history, review of systems, vital signs, physical exam and disposition.\par \par I personally performed the services described in the documentation, reviewed the documentation recorded by the scribe in my presence and it accurately and completely records my words and actions.\par \par \par

## 2020-08-02 NOTE — DATA REVIEWED
[FreeTextEntry1] : CTA Chest dissection protocol 7/29/20:\par 1. The findings of the stented thoracic aortic aneurysm and excluded portion of the thoracic aorta are stable above the level of the diaphragm.\par 2. The abdominal aorta at the level of the diaphragmatic hiatus has increased in size from 7.3 to 8.7 cm; at the level of the SMA from 6.3 to 8.7 cm, and at the level of the renal artery from 5.9 to 7.0 cm.\par 3. A new opacified false lumen has developed anteriorly within the aneurysm. The TOMMY is now seen to arise from this false lumen.\par 4. No evidence of abdominal aortic rupture.\par

## 2020-08-02 NOTE — HISTORY OF PRESENT ILLNESS
[FreeTextEntry1] : 71 yo M with pmhx of HTN, prostate Ca sp radiation and seed, aortic dissection, s/p thoracic aortic aneurysm stent graft (Dr. Madi Lewis 3/21/11), presents today for a surgical consult for his enlarging thoracoabdominal aortic aneurysm . Patient recently presented to [facility] ED with crampy, non-radiating, intermittent 8/10 mid-abdominal pain.\par  \par \par CTA Chest dissection protocol 7/29/20:\par 1. The findings of the stented thoracic aortic aneurysm and excluded portion of the thoracic aorta are stable above the level of the diaphragm.\par 2. The abdominal aorta at the level of the diaphragmatic hiatus has increased in size from 7.3 to 8.7 cm; at the level of the SMA from 6.3 to 8.7 cm, and at the level of the renal artery from 5.9 to 7.0 cm.\par 3. A new opacified false lumen has developed anteriorly within the aneurysm. The TOMMY is now seen to arise from this false lumen.\par 4. No evidence of abdominal aortic rupture.\par \par At that time, patient was encouraged to stay in [facility] for immediate intervention. Patient signed out AMA and presents today for a second opinion. Today, patient still complains of intermittent abdominal pain. He denies any further cardiopulmonary symptoms. Patient lives alone with family residing nearby. He remains independent with all his ADLs. \par

## 2020-08-02 NOTE — PROCEDURE
[FreeTextEntry1] : \par Dr. Willson discussed activity restrictions with the patient, and would advise exercise at a moderate amount with no heavy lifting over one third of body weight, and avoiding heart rates that exceed 140 beats per minute. Every patient must abstain from tobacco and illicit drug use, especially stimulants such as cocaine or methamphetamine. The patient was also counseled on maintaining a healthy heart diet, and losing any excessive weight as this also put undue stress on both the aorta and entire cardiovascular system. Patient was counseled on the importance of medication adherence for blood pressure control, as hypertension is a significant risk factor associated with aortic dissections. First degree family members should be screened for bicuspid valve disease, and ascending aortic aneurysms.\par \par Patient also was advised to view our educational video prior to this visit regarding aortic pathology, lifestyle modifications, risk factors and procedures, retrieved at [email or web address].\par

## 2020-08-02 NOTE — PROGRESS NOTE ADULT - SUBJECTIVE AND OBJECTIVE BOX
CTICU  CRITICAL  CARE  attending     Hand off received 					   Pertinent clinical, laboratory, radiographic, hemodynamic, echocardiographic, respiratory data, microbiologic data and chart were reviewed and analyzed frequently throughout the course of the day and night  Patient seen and examined with CTS/ SH attending at bedside    Pt is a 70y , Male, admitted with expanding descending thoracic aortic aneurysm;  uncontrolled hypertension  pre-op for surgery  on Esmolol and nicardipine infusion  SBP to be maintained below 130    s/p lumbar drain insertion today  s/p CT head; no acute intracranial pathology      FAMILY HISTORY:  PAST MEDICAL & SURGICAL HISTORY:  Prostate cancer  History of aortic dissection  Hypertension  Essential hypertension  Hypertension  Status post endoscopic repair of thoracic aortic aneurysm (TAA)    Patient is a 70y old  Male who presents with a chief complaint of Thoracoabdominal aneurysm (01 Aug 2020 23:13)      14 system review was unremarkable    Vital signs, hemodynamic and respiratory parameters were reviewed from the bedside nursing flowsheet.  ICU Vital Signs Last 24 Hrs  T(C): 37.2 (02 Aug 2020 13:26), Max: 37.6 (02 Aug 2020 01:01)  T(F): 99 (02 Aug 2020 13:26), Max: 99.6 (02 Aug 2020 01:01)  HR: 81 (02 Aug 2020 13:00) (58 - 87)  BP: 167/92 (01 Aug 2020 20:00) (94/56 - 178/88)  BP(mean): 123 (01 Aug 2020 20:00) (70 - 123)  ABP: 174/81 (02 Aug 2020 13:00) (93/47 - 179/81)  ABP(mean): 114 (02 Aug 2020 13:00) (61 - 148)  RR: 20 (02 Aug 2020 13:00) (15 - 22)  SpO2: 96% (02 Aug 2020 13:00) (94% - 99%)    Adult Advanced Hemodynamics Last 24 Hrs  CVP(mm Hg): --  CVP(cm H2O): --  CO: --  CI: --  PA: --  PA(mean): --  PCWP: --  SVR: --  SVRI: --  PVR: --  PVRI: --, ABG - ( 02 Aug 2020 11:46 )  pH, Arterial: 7.49  pH, Blood: x     /  pCO2: 31    /  pO2: 73    / HCO3: 24    / Base Excess: 0.7   /  SaO2: 95                  Intake and output was reviewed and the fluid balance was calculated  Daily     Daily   I&O's Summary    01 Aug 2020 07:01  -  02 Aug 2020 07:00  --------------------------------------------------------  IN: 3757 mL / OUT: 2155 mL / NET: 1602 mL    02 Aug 2020 07:01  -  02 Aug 2020 14:08  --------------------------------------------------------  IN: 1440.8 mL / OUT: 1050 mL / NET: 390.8 mL        All lines and drain sites were assessed  Glycemic trend was reviewedCAPILLARY BLOOD GLUCOSE      POCT Blood Glucose.: 157 mg/dL (02 Aug 2020 11:41)    No acute change in mental status  Auscultation of the chest reveals equal bs  Abdomen is soft  Extremities are warm and well perfused  Wounds appear clean and unremarkable  Antibiotics are periop    labs  CBC Full  -  ( 02 Aug 2020 11:45 )  WBC Count : 7.84 K/uL  RBC Count : 4.14 M/uL  Hemoglobin : 11.6 g/dL  Hematocrit : 34.2 %  Platelet Count - Automated : 215 K/uL  Mean Cell Volume : 82.6 fl  Mean Cell Hemoglobin : 28.0 pg  Mean Cell Hemoglobin Concentration : 33.9 gm/dL  Auto Neutrophil # : x  Auto Lymphocyte # : x  Auto Monocyte # : x  Auto Eosinophil # : x  Auto Basophil # : x  Auto Neutrophil % : x  Auto Lymphocyte % : x  Auto Monocyte % : x  Auto Eosinophil % : x  Auto Basophil % : x    08-02    142  |  108  |  15  ----------------------------<  172<H>  4.0   |  23  |  1.10    Ca    8.8      02 Aug 2020 11:45  Phos  2.1     08-02  Mg     2.0     08-02    TPro  6.7  /  Alb  3.3  /  TBili  0.5  /  DBili  x   /  AST  10  /  ALT  7<L>  /  AlkPhos  82  08-01    PT/INR - ( 02 Aug 2020 11:45 )   PT: 14.2 sec;   INR: 1.19          PTT - ( 02 Aug 2020 11:45 )  PTT:30.5 sec  The current medications were reviewed   MEDICATIONS  (STANDING):  chlorhexidine 0.12% Liquid 30 milliLiter(s) Swish and Spit once  chlorhexidine 4% Liquid 1 Application(s) Topical once  dexMEDEtomidine Infusion 0.5 MICROgram(s)/kG/Hr (9.63 mL/Hr) IV Continuous <Continuous>  dextrose 5%. 1000 milliLiter(s) (50 mL/Hr) IV Continuous <Continuous>  dextrose 50% Injectable 12.5 Gram(s) IV Push once  dextrose 50% Injectable 25 Gram(s) IV Push once  dextrose 50% Injectable 25 Gram(s) IV Push once  esMOLOL  Infusion 150 MICROgram(s)/kG/Min (69.3 mL/Hr) IV Continuous <Continuous>  insulin lispro (HumaLOG) corrective regimen sliding scale   SubCutaneous Before meals and at bedtime  lactated ringers. 1000 milliLiter(s) (75 mL/Hr) IV Continuous <Continuous>  midazolam Injectable 2 milliGRAM(s) IV Push once  mirtazapine 15 milliGRAM(s) Oral daily  niCARdipine Infusion 5 mG/Hr (25 mL/Hr) IV Continuous <Continuous>  pantoprazole    Tablet 40 milliGRAM(s) Oral before breakfast  sodium chloride 0.9% lock flush 3 milliLiter(s) IV Push every 8 hours  tamsulosin 0.4 milliGRAM(s) Oral at bedtime    MEDICATIONS  (PRN):  acetaminophen   Tablet .. 650 milliGRAM(s) Oral every 6 hours PRN Mild Pain (1 - 3)  dextrose 40% Gel 15 Gram(s) Oral once PRN Blood Glucose LESS THAN 70 milliGRAM(s)/deciliter  glucagon  Injectable 1 milliGRAM(s) IntraMuscular once PRN Glucose LESS THAN 70 milligrams/deciliter  hydrALAZINE Injectable 10 milliGRAM(s) IV Push every 6 hours PRN SBP > 130       PROBLEM LIST/ ASSESSMENT:  HEALTH ISSUES - PROBLEM Dx:      ,   Patient is a 70y old  Male who presents with a chief complaint of Thoracoabdominal aneurysm (01 Aug 2020 23:13)        My plan includes :  Maintain SBP < 130  Follow protocol to drain CSF  close hemodynamic, ventilatory and drain monitoring and management per post op routine    Monitor for arrhythmias and monitor parameters for organ perfusion  monitor neurologic status  Head of the bed should remain elevated to 45 deg .   chest PT and IS will be encouraged  monitor adequacy of oxygenation and ventilation and attempt to wean oxygen  Nutritional goals will be met using po eventually , ensure adequate caloric intake and montior the same  Stress ulcer and VTE prophylaxis will be achieved    Glycemic control is satisfactory  Electrolytes have been repleted as necessary and wound care has been carried out. Pain control has been achieved.   agressive physical therapy and early mobility and ambulation goals will be met   The family was updated about the course and plan  CRITICAL CARE TIME SPENT in evaluation and management, reassessments, review and interpretation of labs and x-rays, ventilator and hemodynamic management, formulating a plan and coordinating care: __55____ MIN.  Time does not include procedural time.  CTICU ATTENDING     					    Baltazar Hernandez MD

## 2020-08-02 NOTE — PROGRESS NOTE ADULT - SUBJECTIVE AND OBJECTIVE BOX
Planned Date of Surgery: 8/2/20                                                                                                            Surgeon: Demetris     Procedure: Reop, open Thoracoabdominal aneurysm repair     HPI:  71yo M former smoker with PMHx of HTN, prostate Ca s/p radiation, aortic dissection s/p endovascular repair (2011 by Dr. Madi Lewis) who recently presented to [facility] with 8/10 cramping, non-radiating abdominal pain. CT scan done at that time demonstrated expanding descending aortic aneurysm from 7.3cm to 8.7cm at the level of the diaphragm, 6.3cm to 8.7cm at the level of the SMA and from 539cm to 7.0cm at the level of the renal arteries. Additional a new opacifies false lumen is noted anteriorly within aneurysm. Pt was encouraged to stay at [facility] for further treatment but left AMA to seek second opinion. He presented to Dr. Willson's office7/31 for evaluation and was referred to the ED for admission, blood pressure control and pre-surgical work up.  Pt admitted to CT surgery 9 east for strict BP control and pre-surgical work up.     PAST MEDICAL & SURGICAL HISTORY:  Prostate cancer  History of aortic dissection  Hypertension  Essential hypertension  Hypertension  Status post endoscopic repair of thoracic aortic aneurysm (TAA)      No Known Allergies      MEDICATIONS  (STANDING):  chlorhexidine 0.12% Liquid 30 milliLiter(s) Swish and Spit once  chlorhexidine 4% Liquid 1 Application(s) Topical once  dexMEDEtomidine Infusion 0.5 MICROgram(s)/kG/Hr (9.63 mL/Hr) IV Continuous <Continuous>  esMOLOL  Infusion 150 MICROgram(s)/kG/Min (69.3 mL/Hr) IV Continuous <Continuous>  insulin lispro (HumaLOG) corrective regimen sliding scale   SubCutaneous Before meals and at bedtime  lactated ringers. 1000 milliLiter(s) (75 mL/Hr) IV Continuous <Continuous>  midazolam Injectable 2 milliGRAM(s) IV Push once  mirtazapine 15 milliGRAM(s) Oral daily  niCARdipine Infusion 5 mG/Hr (25 mL/Hr) IV Continuous <Continuous>  pantoprazole    Tablet 40 milliGRAM(s) Oral before breakfast  sodium chloride 0.9% lock flush 3 milliLiter(s) IV Push every 8 hours  tamsulosin 0.4 milliGRAM(s) Oral at bedtime    MEDICATIONS  (PRN):  acetaminophen   Tablet .. 650 milliGRAM(s) Oral every 6 hours PRN Mild Pain (1 - 3)  dextrose 40% Gel 15 Gram(s) Oral once PRN Blood Glucose LESS THAN 70 milliGRAM(s)/deciliter  glucagon  Injectable 1 milliGRAM(s) IntraMuscular once PRN Glucose LESS THAN 70 milligrams/deciliter  hydrALAZINE Injectable 10 milliGRAM(s) IV Push every 6 hours PRN SBP > 130      On Beta Blocker? YES / NO / CONTRAINDICATED Reason_______________    Labs:                        11.6   7.84  )-----------( 215      ( 02 Aug 2020 11:45 )             34.2     08-02    142  |  108  |  15  ----------------------------<  172<H>  4.0   |  23  |  1.10    Ca    8.8      02 Aug 2020 11:45  Phos  2.1     08-02  Mg     2.0     08-02    TPro  6.7  /  Alb  3.3  /  TBili  0.5  /  DBili  x   /  AST  10  /  ALT  7<L>  /  AlkPhos  82  08-01    PT/INR - ( 02 Aug 2020 11:45 )   PT: 14.2 sec;   INR: 1.19          PTT - ( 02 Aug 2020 11:45 )  PTT:30.5 sec    ABO Interpretation: B (08-02-20 @ 09:50)    ABG - ( 02 Aug 2020 11:46 )  pH, Arterial: 7.49  pH, Blood: x     /  pCO2: 31    /  pO2: 73    / HCO3: 24    / Base Excess: 0.7   /  SaO2: 95                        Hgb A1C:    EKG:    CXR:    CT Scans:    Cath Report:    Echo:    PFT's:    Carotid Duplex:    Consult in Chart?  YES / NO  Consent in Chart? YES / NO  Pre-op Orders Placed? YES / NO  Blood Prodeucts Ordered? YES / NO  NPO ordered? YES / NO Planned Date of Surgery: 8/2/20                                                                                                            Surgeon: Demetris     Procedure: Reop, open Thoracoabdominal aneurysm repair     HPI:  71yo M former smoker with PMHx of HTN, prostate Ca s/p radiation, aortic dissection s/p endovascular repair (2011 by Dr. Madi Lewis) who recently presented to [facility] with 8/10 cramping, non-radiating abdominal pain. CT scan done at that time demonstrated expanding descending aortic aneurysm from 7.3cm to 8.7cm at the level of the diaphragm, 6.3cm to 8.7cm at the level of the SMA and from 539cm to 7.0cm at the level of the renal arteries. Additional a new opacifies false lumen is noted anteriorly within aneurysm. Pt was encouraged to stay at [facility] for further treatment but left AMA to seek second opinion. He presented to Dr. Willson's office7/31 for evaluation and was referred to the ED for admission, blood pressure control and pre-surgical work up.  Pt admitted to CT surgery 9 east for strict BP control and pre-surgical work up.     PAST MEDICAL & SURGICAL HISTORY:  Prostate cancer  History of aortic dissection  Hypertension  Essential hypertension  Hypertension  Status post endoscopic repair of thoracic aortic aneurysm (TAA)      No Known Allergies      MEDICATIONS  (STANDING):  chlorhexidine 0.12% Liquid 30 milliLiter(s) Swish and Spit once  chlorhexidine 4% Liquid 1 Application(s) Topical once  dexMEDEtomidine Infusion 0.5 MICROgram(s)/kG/Hr (9.63 mL/Hr) IV Continuous <Continuous>  esMOLOL  Infusion 150 MICROgram(s)/kG/Min (69.3 mL/Hr) IV Continuous <Continuous>  insulin lispro (HumaLOG) corrective regimen sliding scale   SubCutaneous Before meals and at bedtime  lactated ringers. 1000 milliLiter(s) (75 mL/Hr) IV Continuous <Continuous>  midazolam Injectable 2 milliGRAM(s) IV Push once  mirtazapine 15 milliGRAM(s) Oral daily  niCARdipine Infusion 5 mG/Hr (25 mL/Hr) IV Continuous <Continuous>  pantoprazole    Tablet 40 milliGRAM(s) Oral before breakfast  sodium chloride 0.9% lock flush 3 milliLiter(s) IV Push every 8 hours  tamsulosin 0.4 milliGRAM(s) Oral at bedtime    MEDICATIONS  (PRN):  acetaminophen   Tablet .. 650 milliGRAM(s) Oral every 6 hours PRN Mild Pain (1 - 3)  dextrose 40% Gel 15 Gram(s) Oral once PRN Blood Glucose LESS THAN 70 milliGRAM(s)/deciliter  glucagon  Injectable 1 milliGRAM(s) IntraMuscular once PRN Glucose LESS THAN 70 milligrams/deciliter  hydrALAZINE Injectable 10 milliGRAM(s) IV Push every 6 hours PRN SBP > 130      On Beta Blocker? YES     Labs:                        11.6   7.84  )-----------( 215      ( 02 Aug 2020 11:45 )             34.2     08-02    142  |  108  |  15  ----------------------------<  172<H>  4.0   |  23  |  1.10    Ca    8.8      02 Aug 2020 11:45  Phos  2.1     08-02  Mg     2.0     08-02    TPro  6.7  /  Alb  3.3  /  TBili  0.5  /  DBili  x   /  AST  10  /  ALT  7<L>  /  AlkPhos  82  08-01    PT/INR - ( 02 Aug 2020 11:45 )   PT: 14.2 sec;   INR: 1.19          PTT - ( 02 Aug 2020 11:45 )  PTT:30.5 sec    ABO Interpretation: B (08-02-20 @ 09:50)    ABG - ( 02 Aug 2020   11:46 )  pH, Arterial: 7.49  pH, Blood: x     /  pCO2: 31    /  pO2: 73    / HCO3: 24    / Base Excess: 0.7   /  SaO2: 95                        Hgb A1C: 6.0    EKG: < from: 12 Lead ECG (07.29.20 @ 13:43) >  Diagnosis Line Normal sinus rhythm  Possible Left atrial enlargement  Left ventricular hypertrophy with repolarization abnormality  Abnormal ECG    < end of copied text >    CXR: < from: Xray Chest 1 View- PORTABLE-Routine (08.02.20 @ 05:27) >  Portable examination of the chest demonstrates cardiomegaly. No interval change position remaining support devices in comparison to prior examination of the chest 8/1/2020. Status post placement aortic stent. No acute infiltrates. Deviation of the trachea to the left noted which may reflect substernal mass. Cardiomediastinal silhouette noted unchanged    Impression: No interval change noted in comparison to prior examination of the chest 8/1/2020.    < end of copied text >      CT Scans: < from: CT Angio Chest Dissection Protocol (07.29.20 @ 16:28) >  IMPRESSION:    Compared to the previous CTA of 12/8/2017:    1. The findings of the stented thoracic aortic aneurysm and excluded portion of the thoracic aorta are stable above the level of the diaphragm.    2. The abdominal aorta at the level of the diaphragmatic hiatus has increased in size from 7.3 to 8.7 cm; at the level of the SMA from 6.3 to 8.7 cm, and at the level of the renal artery from 5.9 to 7.0 cm.    3. A new opacified false lumen has developed anteriorly within the aneurysm. The TOMMY is now seen to arise from this false lumen.    4. No evidence of abdominal aortic rupture.    < end of copied text >      Cath Report: -    Echo: < from: TTE Echo Complete w/o Contrast w/ Doppler (07.31.20 @ 15:39) >  CONCLUSIONS:     1. The aortic valve is mildly thickened. No hemodynamically significant aortic stenosis. There is trace to mild aortic regurgitation.   2. The right ventricle is normal in size. Right ventricular systolic function is normal.   3. There is severe concentric left ventricular hypertrophy. The left ventricle is normal in size and systolic function with a calculated ejection fraction of 75%.   4. Small pericardial effusion without echocardiographic evidence of cardiac tamponade physiology.    < end of copied text >    Consult in Chart?  YES   Consent in Chart? YES   Pre-op Orders Placed? YES   Blood Products Ordered? YES  NPO ordered? YES

## 2020-08-02 NOTE — ASSESSMENT
[FreeTextEntry1] : 70 year old male with known enlarging thoracoabdominal aortic aneurysm, consulted by Dr. Willson for aortic aneurysm repair. Dr. Willson reviewed the indications for surgery, and used our webpage [email or web address] to illustrate the aorta and anatomy of the heart. He discussed the indications for surgery with the patient. Those indications are the following: size greater than 5.0 cm, symptomatic aneurysms, family history of aortic dissection or aneurysm death with a size greater than 4.5 cm, other necessary cardiac procedures such as coronary artery bypass grafting or valvular disorders with an aneurysm greater than 4.5 cm, or connective tissue disorders with an aneurysm size greater than 4.5 cm. The patient meets the indications.\par \par Dr. Willson reviewed the CT images with the patient and spouse. Dr. Willson feels the patient will benefit and is a candidate for Reop open thoracoabdominal aneurysm repair.  discussed the risks, benefits and alternatives to surgery. Risks include but not limited to death, heart attack, bleeding, stroke, kidney problems,  and infection. He quoted a 5-10% operative mortality and complication risk. Patient understands there is a small risk of tracheostomy insertion for respiratory failure and feeding tube insertion for failure to thrive/ nutrition deficiency. Patient also understands there is a risk of lower extremity paralysis associated with this surgery.  All questions were addressed and patient agrees to proceed with surgery.\par \par Plan\par Admit to [facility] for BP control and PST\par OR on 8/3 or 8/4

## 2020-08-02 NOTE — PRE-OP CHECKLIST - SELECT TESTS ORDERED
BMP/Type and Screen/POCT Blood Glucose/PT/PTT/CBC/INR/Spirometry POCT Blood Glucose/CXR/INR/Spirometry/BMP/CBC/Type and Screen/PT/PTT

## 2020-08-03 NOTE — PROGRESS NOTE ADULT - SUBJECTIVE AND OBJECTIVE BOX
Procedure: thoracabdominal and repair descending aorta replacement.    Surgeon: Dr. Willson/Rina    S: Pt is intubated and sedated.     O:  T(C): --  T(F): --  HR: 108 (08-03-20 @ 20:15) (107 - 113)  BP: --  RR: 20 (08-03-20 @ 20:15) (10 - 20)  SpO2: 96% (08-03-20 @ 20:15) (91% - 97%)  Wt(kg): --                        12.3   7.82  )-----------( 105      ( 03 Aug 2020 19:15 )             37.5     08-03    152<H>  |  110<H>  |  15  ----------------------------<  195<H>  see note   |  18<L>  |  1.26    Ca    9.0      03 Aug 2020 19:15  Phos  3.4     08-03  Mg     1.9     08-03    TPro  2.8<L>  /  Alb  1.6<L>  /  TBili  1.1  /  DBili  x   /  AST  see note  /  ALT  see note  /  AlkPhos  29<L>  08-03      Gen: NAD, resting comfortably in bed  C/V: NSR  Pulm: Nonlabored breathing, no respiratory distress  Abd: Abdominal incisional dressing CDI, no bleeding or hematoma  Extrem: No LE swelling, leg warm to touch.  Vascular: Palpable DP/PT pulses b/l      A/P: 70yMale s/p above procedure  Diet:NPO  IVF: LR@75ml/hr  Pain/nausea control  DVT ppx: SCD  Ancef x5 doses  Keep sedated tonight  Monitor for any sign of bowel ischemia  Trend lactate  Rest of plan as per neuro surgery team

## 2020-08-03 NOTE — CONSULT NOTE ADULT - SUBJECTIVE AND OBJECTIVE BOX
Patient is a 70y old  Male who presents with a chief complaint of Thoracoabdominal aneurysm (02 Aug 2020 22:49)    HPI:  71yo Male with HTN, CA prostate S/P  radiation, aortic dissection s/p endovascular repair (2011 by Dr. Madi Lewis). FOund to have expanding descending AAA from 7.3cm to 8.7cm at the level of the diaphragm, 6.3cm to 8.7cm at the level of the SMA and from 5.9cm to 7.0cm at the level of the renal arteries. UNderwent AAA repair today. Operative course- received 7L, did not respond to lasix and was anuric. Nephro consulted for CVVHD.    PAST MEDICAL & SURGICAL HISTORY:  Prostate cancer  History of aortic dissection  Hypertension  Essential hypertension  Hypertension  Status post endoscopic repair of thoracic aortic aneurysm (TAA)      Allergies:  No Known Allergies    Home Medications:   acetaminophen   Tablet .. 650 milliGRAM(s) Oral every 6 hours PRN  BUpivacaine liposome 1.3% Injectable (no eMAR) 20 milliLiter(s) Local Injection once  chlorhexidine 0.12% Liquid 30 milliLiter(s) Swish and Spit once  dexMEDEtomidine Infusion 0.5 MICROgram(s)/kG/Hr IV Continuous <Continuous>  dextrose 40% Gel 15 Gram(s) Oral once PRN  dextrose 5%. 1000 milliLiter(s) IV Continuous <Continuous>  dextrose 50% Injectable 12.5 Gram(s) IV Push once  dextrose 50% Injectable 25 Gram(s) IV Push once  dextrose 50% Injectable 25 Gram(s) IV Push once  esMOLOL  Infusion 150 MICROgram(s)/kG/Min IV Continuous <Continuous>  glucagon  Injectable 1 milliGRAM(s) IntraMuscular once PRN  hydrALAZINE Injectable 10 milliGRAM(s) IV Push every 6 hours PRN  insulin lispro (HumaLOG) corrective regimen sliding scale   SubCutaneous Before meals and at bedtime  lactated ringers. 1000 milliLiter(s) IV Continuous <Continuous>  midazolam Injectable 2 milliGRAM(s) IV Push once  mirtazapine 15 milliGRAM(s) Oral daily  niCARdipine Infusion 5 mG/Hr IV Continuous <Continuous>  pantoprazole    Tablet 40 milliGRAM(s) Oral before breakfast  sodium chloride 0.9% lock flush 3 milliLiter(s) IV Push every 8 hours  tamsulosin 0.4 milliGRAM(s) Oral at bedtime      Hospital Medications:   MEDICATIONS  (STANDING):  BUpivacaine liposome 1.3% Injectable (no eMAR) 20 milliLiter(s) Local Injection once  chlorhexidine 0.12% Liquid 30 milliLiter(s) Swish and Spit once  dexMEDEtomidine Infusion 0.5 MICROgram(s)/kG/Hr (9.63 mL/Hr) IV Continuous <Continuous>  dextrose 5%. 1000 milliLiter(s) (50 mL/Hr) IV Continuous <Continuous>  dextrose 50% Injectable 12.5 Gram(s) IV Push once  dextrose 50% Injectable 25 Gram(s) IV Push once  dextrose 50% Injectable 25 Gram(s) IV Push once  esMOLOL  Infusion 150 MICROgram(s)/kG/Min (69.3 mL/Hr) IV Continuous <Continuous>  insulin lispro (HumaLOG) corrective regimen sliding scale   SubCutaneous Before meals and at bedtime  lactated ringers. 1000 milliLiter(s) (75 mL/Hr) IV Continuous <Continuous>  midazolam Injectable 2 milliGRAM(s) IV Push once  mirtazapine 15 milliGRAM(s) Oral daily  niCARdipine Infusion 5 mG/Hr (25 mL/Hr) IV Continuous <Continuous>  pantoprazole    Tablet 40 milliGRAM(s) Oral before breakfast  sodium chloride 0.9% lock flush 3 milliLiter(s) IV Push every 8 hours  tamsulosin 0.4 milliGRAM(s) Oral at bedtime      SOCIAL HISTORY:  Denies ETOh, Smoking,     Family History:  FAMILY HISTORY:      ROS:    VITALS:  T(F): 98.1 (08-03-20 @ 05:01), Max: 99.6 (08-02-20 @ 20:23)  HR: 60 (08-03-20 @ 06:00)  BP: 129/61 (08-02-20 @ 22:06)  RR: 15 (08-03-20 @ 06:00)  SpO2: 98% (08-03-20 @ 06:00)  Wt(kg): --    08-02 @ 07:01  -  08-03 @ 07:00  --------------------------------------------------------  IN: 4213 mL / OUT: 2585 mL / NET: 1628 mL      Height (cm): 177.8 (08-02 @ 22:06)  Weight (kg): 77 (08-02 @ 22:06)  BMI (kg/m2): 24.4 (08-02 @ 22:06)  BSA (m2): 1.95 (08-02 @ 22:06)  CAPILLARY BLOOD GLUCOSE      POCT Blood Glucose.: 107 mg/dL (03 Aug 2020 06:18)  POCT Blood Glucose.: 118 mg/dL (02 Aug 2020 21:40)      PHYSICAL EXAM:  GENERAL: Alert, awake, oriented x3   HEENT: DILCIA, EOMI, neck supple, no JVP, no carotid bruits, no palpable thyromegaly or lymphadenopathy, no carotid bruits  CHEST/LUNG: Bilateral clear breath sounds, no rales, no crepitations, no wheezing  HEART: Regular rate and rhythm, no murmur, no gallops, no rub   ABDOMEN: Soft, nontender, non distended, bowel sounds present, no palpable organomegaly, no guarding, no rigidity, no rebound tenderness.  : No flank or supra pubic tenderness.  EXTREMITIES: Peripheral pulses are palpable, no pedal edema, no calf tenderness  Musculoskeletal: No joint or spinal tenderness  Neurology: AAOx3, no focal neurological deficit, Motor and sensory systems are intact.  SKIN: No rash or skin lesion   ACCESS: LUE AVF w/bruit and thrill     LABS:  08-03    143  |  111<H>  |  13  ----------------------------<  120<H>  4.0   |  23  |  1.18    Ca    8.6      03 Aug 2020 02:31  Phos  3.4     08-03  Mg     1.9     08-03      Creatinine Trend: 1.18 <--, 1.10 <--, 1.19 <--, 1.33 <--, 1.91 <--, 1.77 <--, 1.87 <--, 1.2 <--                        10.0   7.03  )-----------( 187      ( 03 Aug 2020 02:31 )             30.2       Urine Studies:          08-02-20 @ 07:01  -  08-03-20 @ 07:00  --------------------------------------------------------  IN: 4213 mL / OUT: 2585 mL / NET: 1628 mL        RADIOLOGY & ADDITIONAL STUDIES: Patient is a 70y old  Male who presents with a chief complaint of Thoracoabdominal aneurysm (02 Aug 2020 22:49)    HPI:  69yo Male with HTN, CA prostate S/P  radiation, aortic dissection s/p endovascular repair (2011 by Dr. Madi Lewis). FOund to have expanding descending AAA from 7.3cm to 8.7cm at the level of the diaphragm, 6.3cm to 8.7cm at the level of the SMA and from 5.9cm to 7.0cm at the level of the renal arteries. UNderwent AAA repair today. Operative course- received 7L, did not respond to lasix and was anuric. Nephro consulted for CVVHD.     PAST MEDICAL & SURGICAL HISTORY:  Prostate cancer  History of aortic dissection  Hypertension  Essential hypertension  Hypertension  Status post endoscopic repair of thoracic aortic aneurysm (TAA)      Allergies:  No Known Allergies    Home Medications:   acetaminophen   Tablet .. 650 milliGRAM(s) Oral every 6 hours PRN  BUpivacaine liposome 1.3% Injectable (no eMAR) 20 milliLiter(s) Local Injection once  chlorhexidine 0.12% Liquid 30 milliLiter(s) Swish and Spit once  dexMEDEtomidine Infusion 0.5 MICROgram(s)/kG/Hr IV Continuous <Continuous>  dextrose 40% Gel 15 Gram(s) Oral once PRN  dextrose 5%. 1000 milliLiter(s) IV Continuous <Continuous>  dextrose 50% Injectable 12.5 Gram(s) IV Push once  dextrose 50% Injectable 25 Gram(s) IV Push once  dextrose 50% Injectable 25 Gram(s) IV Push once  esMOLOL  Infusion 150 MICROgram(s)/kG/Min IV Continuous <Continuous>  glucagon  Injectable 1 milliGRAM(s) IntraMuscular once PRN  hydrALAZINE Injectable 10 milliGRAM(s) IV Push every 6 hours PRN  insulin lispro (HumaLOG) corrective regimen sliding scale   SubCutaneous Before meals and at bedtime  lactated ringers. 1000 milliLiter(s) IV Continuous <Continuous>  midazolam Injectable 2 milliGRAM(s) IV Push once  mirtazapine 15 milliGRAM(s) Oral daily  niCARdipine Infusion 5 mG/Hr IV Continuous <Continuous>  pantoprazole    Tablet 40 milliGRAM(s) Oral before breakfast  sodium chloride 0.9% lock flush 3 milliLiter(s) IV Push every 8 hours  tamsulosin 0.4 milliGRAM(s) Oral at bedtime      Hospital Medications:   MEDICATIONS  (STANDING):  BUpivacaine liposome 1.3% Injectable (no eMAR) 20 milliLiter(s) Local Injection once  chlorhexidine 0.12% Liquid 30 milliLiter(s) Swish and Spit once  dexMEDEtomidine Infusion 0.5 MICROgram(s)/kG/Hr (9.63 mL/Hr) IV Continuous <Continuous>  dextrose 5%. 1000 milliLiter(s) (50 mL/Hr) IV Continuous <Continuous>  dextrose 50% Injectable 12.5 Gram(s) IV Push once  dextrose 50% Injectable 25 Gram(s) IV Push once  dextrose 50% Injectable 25 Gram(s) IV Push once  esMOLOL  Infusion 150 MICROgram(s)/kG/Min (69.3 mL/Hr) IV Continuous <Continuous>  insulin lispro (HumaLOG) corrective regimen sliding scale   SubCutaneous Before meals and at bedtime  lactated ringers. 1000 milliLiter(s) (75 mL/Hr) IV Continuous <Continuous>  midazolam Injectable 2 milliGRAM(s) IV Push once  mirtazapine 15 milliGRAM(s) Oral daily  niCARdipine Infusion 5 mG/Hr (25 mL/Hr) IV Continuous <Continuous>  pantoprazole    Tablet 40 milliGRAM(s) Oral before breakfast  sodium chloride 0.9% lock flush 3 milliLiter(s) IV Push every 8 hours  tamsulosin 0.4 milliGRAM(s) Oral at bedtime      SOCIAL HISTORY:  Denies ETOh, Smoking,     Family History:  FAMILY HISTORY:      ROS: Unable to obtain, intubated, sedated    VITALS:  T(F): 98.1 (08-03-20 @ 05:01), Max: 99.6 (08-02-20 @ 20:23)  HR: 60 (08-03-20 @ 06:00)  BP: 129/61 (08-02-20 @ 22:06)  RR: 15 (08-03-20 @ 06:00)  SpO2: 98% (08-03-20 @ 06:00)  Wt(kg): --    08-02 @ 07:01  -  08-03 @ 07:00  --------------------------------------------------------  IN: 4213 mL / OUT: 2585 mL / NET: 1628 mL      Height (cm): 177.8 (08-02 @ 22:06)  Weight (kg): 77 (08-02 @ 22:06)  BMI (kg/m2): 24.4 (08-02 @ 22:06)  BSA (m2): 1.95 (08-02 @ 22:06)  CAPILLARY BLOOD GLUCOSE      POCT Blood Glucose.: 107 mg/dL (03 Aug 2020 06:18)  POCT Blood Glucose.: 118 mg/dL (02 Aug 2020 21:40)      PHYSICAL EXAM:  GENERAL: Intubated sedated  CHEST/LUNG: Bilateral clear breath sounds, no rale  HEART: Regular rate and rhythm, no murmur  ABDOMEN: Abdominal drainages  EXTREMITIES: No pedal edema    LABS:  08-03    143  |  111<H>  |  13  ----------------------------<  120<H>  4.0   |  23  |  1.18    Ca    8.6      03 Aug 2020 02:31  Phos  3.4     08-03  Mg     1.9     08-03      Creatinine Trend: 1.18 <--, 1.10 <--, 1.19 <--, 1.33 <--, 1.91 <--, 1.77 <--, 1.87 <--, 1.2 <--                        10.0   7.03  )-----------( 187      ( 03 Aug 2020 02:31 )             30.2       Urine Studies:          08-02-20 @ 07:01  -  08-03-20 @ 07:00  --------------------------------------------------------  IN: 4213 mL / OUT: 2585 mL / NET: 1628 mL        RADIOLOGY & ADDITIONAL STUDIES:

## 2020-08-03 NOTE — PROGRESS NOTE ADULT - SUBJECTIVE AND OBJECTIVE BOX
VERENICE COUGHLIN  MRN-[id number]  Patient is a 70y old  Male who presents with a chief complaint of Thoracoabdominal aneurysm (03 Aug 2020 20:31)    HPI:  71yo M former smoker with PMHx of HTN, prostate Ca s/p radiation, aortic dissection s/p endovascular repair (2011 by Dr. Madi Lewis) who recently presented to [facility] with 8/10 cramping, non-radiating abdominal pain. CT scan done at that time demonstrated expanding descending aortic aneurysm from 7.3cm to 8.7cm at the level of the diaphragm, 6.3cm to 8.7cm at the level of the SMA and from 539cm to 7.0cm at the level of the renal arteries. Additional a new opacifies false lumen is noted anteriorly within aneurysm. Pt was encouraged to stay at [facility] for further treatment but left AMA to seek second opinion. He presented to Dr. Willson's office today 7/31 for evaluation and was referred to the ED for admission, blood pressure control and pre-surgical work up. In the ED afebrile, HR 67, /100, SpO2 97% on RA. H&H stable, WBC WNL, Cr elevated to 1.87, LFTs WNL. Pt admitted to CT surgery 9 east for strict BP control and pre-surgical work up. Pt currently endorsing continued abdominal cramping and intermittent back pain. Denies CP, SOB, N/V, LE weakness (31 Jul 2020 15:00)      Surgery/Hospital course:    Today:    REVIEW OF SYSTEMS:  Gen: No fever  EYES/ENT: No visual changes;  No vertigo or throat pain   NECK: No pain   RES:  No shortness of breath or Cough  Chest: + incisional pain  CARD: No chest pain   GI: No abdominal pain  : No dysuria  NEURO: No weakness  SKIN: No itching, rashes     Physical Exam:  Vital Signs Last 24 Hrs  T(C): 34.4 (03 Aug 2020 21:00), Max: 37 (03 Aug 2020 01:01)  T(F): 93.9 (03 Aug 2020 21:00), Max: 98.6 (03 Aug 2020 01:01)  HR: 104 (03 Aug 2020 22:30) (60 - 121)  BP: --  BP(mean): --  RR: 22 (03 Aug 2020 22:30) (10 - 26)  SpO2: 100% (03 Aug 2020 22:30) (91% - 100%)  Gen:  Awake, alert   CNS: sedated  Neck: no JVD  RES : clear , no wheezing    Chest:   + chest tubes                     CVS: Regular  rhythm. Normal S1/S2  Abd: Soft, non-distended. Bowel sounds present.  Skin: No rash.  Ext:  no edema, A Line  ============================I/O===========================   I&O's Detail    02 Aug 2020 07:01  -  03 Aug 2020 07:00  --------------------------------------------------------  IN:    Albumin 5%  - 250 mL: 500 mL    dexmedetomidine Infusion: 80.8 mL    esmolol Infusion: 2521.5 mL    IV PiggyBack: 333.2 mL    Lactated Ringers IV Bolus: 100 mL    niCARdipine Infusion: 627.5 mL    Oral Fluid: 50 mL  Total IN: 4213 mL    OUT:    Drain: 205 mL    Indwelling Catheter - Urethral: 2380 mL  Total OUT: 2585 mL    Total NET: 1628 mL      03 Aug 2020 07:01  -  03 Aug 2020 22:47  --------------------------------------------------------  IN:    Cryoprecipitate: 242 mL    IV PiggyBack: 200 mL    lactated ringers.: 2000 mL    Packed Red Blood Cells: 350 mL    Plasma: 708 mL    Platelets - Single Donor: 224 mL    sodium chloride 0.9%.: 300 mL  Total IN: 4024 mL    OUT:    Bulb: 450 mL    Chest Tube: 47 mL    Chest Tube: 430 mL    Chest Tube: 35 mL    Drain: 40 mL    Indwelling Catheter - Urethral: 26 mL  Total OUT: 1028 mL    Total NET: 2996 mL        ============================ LABS =========================                        10.9   6.31  )-----------( 84       ( 03 Aug 2020 20:57 )             33.2     08-03    150<H>  |  108  |  17  ----------------------------<  207<H>  4.2   |  17<L>  |  1.36<H>    Ca    8.8      03 Aug 2020 20:57  Phos  3.4     08-03  Mg     1.9     08-03    TPro  2.6<L>  /  Alb  1.3<L>  /  TBili  1.3<H>  /  DBili  x   /  AST  521<H>  /  ALT  430<H>  /  AlkPhos  29<L>  08-03    LIVER FUNCTIONS - ( 03 Aug 2020 20:57 )  Alb: 1.3 g/dL / Pro: 2.6 g/dL / ALK PHOS: 29 U/L / ALT: 430 U/L / AST: 521 U/L / GGT: x           PT/INR - ( 03 Aug 2020 20:57 )   PT: 14.9 sec;   INR: 1.26          PTT - ( 03 Aug 2020 20:57 )  PTT:54.7 sec  ABG - ( 03 Aug 2020 22:19 )  pH, Arterial: 7.42  pH, Blood: x     /  pCO2: 23    /  pO2: 271   / HCO3: 15    / Base Excess: -8.3  /  SaO2: 99                  ======================Micro/Rad/Cardio=================  Culture: Reviewed   CXR: Reviewed  Echo:Reviewed  ======================================================  PAST MEDICAL & SURGICAL HISTORY:  Prostate cancer  History of aortic dissection  Hypertension  Essential hypertension  Hypertension  Status post endoscopic repair of thoracic aortic aneurysm (TAA)    ====================ASSESMENT ==============  OR  Acute Respiratory Failure post op  acute on chronic systolic CHF  hemodynamic instability  Anemia blood loss  Hyperglycemia  Diabetes mellitus type 2  Hypothyroidism     Plan:    08-03-20 @ 07:01 - 08-03-20 @ 22:47  --------------------------------------------------------  IN: 4024 mL      ====================== NEUROLOGY=====================  meperidine     Injectable 25 milliGRAM(s) IV Push once    ==================== RESPIRATORY======================  Mechanical Ventilation:  Mode: AC/ CMV (Assist Control/ Continuous Mandatory Ventilation)  RR (machine): 20  TV (machine): 500  FiO2: 100  PEEP: 7  ITime: 1  MAP: 16  PIP: 36    wean to extubate    ====================CARDIOVASCULAR==================  hydrALAZINE Injectable 10 milliGRAM(s) IV Push every 6 hours PRN SBP > 130    ===================HEMATOLOGIC/ONC ===================    monitor plts and Hb/Hct  ===================== RENAL =========================  Wilson for monitoring urine output    ==================== GASTROINTESTINAL===================  calcium chloride Injectable 1000 milliGRAM(s) IV Push once  dextrose 5%. 1000 milliLiter(s) (50 mL/Hr) IV Continuous <Continuous>  lactated ringers. 1000 milliLiter(s) (75 mL/Hr) IV Continuous <Continuous>  pantoprazole  Injectable 40 milliGRAM(s) IV Push daily  sodium chloride 0.9%. 1000 milliLiter(s) (10 mL/Hr) IV Continuous <Continuous>    =======================    ENDOCRINE  =====================  dextrose 40% Gel 15 Gram(s) Oral once PRN Blood Glucose LESS THAN 70 milliGRAM(s)/deciliter  dextrose 50% Injectable 12.5 Gram(s) IV Push once  dextrose 50% Injectable 25 Gram(s) IV Push once  dextrose 50% Injectable 25 Gram(s) IV Push once  insulin lispro (HumaLOG) corrective regimen sliding scale   SubCutaneous Before meals and at bedtime    ========================INFECTIOUS DISEASE================  ceFAZolin   IVPB 2000 milliGRAM(s) IV Intermittent every 8 hours    .crit VERENICE COUGHLIN  MRN-[id number]  Patient is a 70y old  Male who presents with a chief complaint of Thoracoabdominal aneurysm (03 Aug 2020 20:31)    HPI:  69yo M former smoker with PMHx of HTN, prostate Ca s/p radiation, aortic dissection s/p endovascular repair (2011 by Dr. Madi Lewis) who recently presented to [facility] with 8/10 cramping, non-radiating abdominal pain. CT scan done at that time demonstrated expanding descending aortic aneurysm from 7.3cm to 8.7cm at the level of the diaphragm, 6.3cm to 8.7cm at the level of the SMA and from 539cm to 7.0cm at the level of the renal arteries. Additional a new opacifies false lumen is noted anteriorly within aneurysm. Pt was encouraged to stay at [facility] for further treatment but left AMA to seek second opinion. He presented to Dr. Willson's office today 7/31 for evaluation and was referred to the ED for admission, blood pressure control and pre-surgical work up. In the ED afebrile, HR 67, /100, SpO2 97% on RA. H&H stable, WBC WNL, Cr elevated to 1.87, LFTs WNL. Pt admitted to CT surgery 9 east for strict BP control and pre-surgical work up. Pt currently endorsing continued abdominal cramping and intermittent back pain. Denies CP, SOB, N/V, LE weakness (31 Jul 2020 15:00)      Surgery/Hospital course:   08/03/2020 Thoracoabdominal aneurysm repair     patient is intubated, sedated, full vent support on 100% FiO2,  IV pressors and inotropes:  Epinephrine, norepinephrine, vasopressin   acute renal failure, right femur hemodialysis catheter was inserted  for CRRT    REVIEW OF SYSTEMS:   not obtainable intubated    Physical Exam:  Vital Signs Last 24 Hrs  T(C): 34.4 (03 Aug 2020 21:00), Max: 37 (03 Aug 2020 01:01)  T(F): 93.9 (03 Aug 2020 21:00), Max: 98.6 (03 Aug 2020 01:01)  HR: 104 (03 Aug 2020 22:30) (60 - 121)  BP: --  BP(mean): --  RR: 22 (03 Aug 2020 22:30) (10 - 26)  SpO2: 100% (03 Aug 2020 22:30) (91% - 100%)  Gen:   intubated, postanesthesia  CNS: sedated  Neck:  right IJ central line  RES : clear , no wheezing    Chest:   + chest tubes                     CVS: Regular  rhythm. Normal S1/S2  Abd:  thoracoabdominal incision, Soft,  mild distended. Bowel sounds  very hypoactive present.  Skin: No rash.  Ext:  no edema, A Line  ============================I/O===========================   I&O's Detail    02 Aug 2020 07:01  -  03 Aug 2020 07:00  --------------------------------------------------------  IN:    Albumin 5%  - 250 mL: 500 mL    dexmedetomidine Infusion: 80.8 mL    esmolol Infusion: 2521.5 mL    IV PiggyBack: 333.2 mL    Lactated Ringers IV Bolus: 100 mL    niCARdipine Infusion: 627.5 mL    Oral Fluid: 50 mL  Total IN: 4213 mL    OUT:    Drain: 205 mL    Indwelling Catheter - Urethral: 2380 mL  Total OUT: 2585 mL    Total NET: 1628 mL      03 Aug 2020 07:01  -  03 Aug 2020 22:47  --------------------------------------------------------  IN:    Cryoprecipitate: 242 mL    IV PiggyBack: 200 mL    lactated ringers.: 2000 mL    Packed Red Blood Cells: 350 mL    Plasma: 708 mL    Platelets - Single Donor: 224 mL    sodium chloride 0.9%.: 300 mL  Total IN: 4024 mL    OUT:    Bulb: 450 mL    Chest Tube: 47 mL    Chest Tube: 430 mL    Chest Tube: 35 mL    Drain: 40 mL    Indwelling Catheter - Urethral: 26 mL  Total OUT: 1028 mL    Total NET: 2996 mL        ============================ LABS =========================                        10.9   6.31  )-----------( 84       ( 03 Aug 2020 20:57 )             33.2     08-03    150<H>  |  108  |  17  ----------------------------<  207<H>  4.2   |  17<L>  |  1.36<H>    Ca    8.8      03 Aug 2020 20:57  Phos  3.4     08-03  Mg     1.9     08-03    TPro  2.6<L>  /  Alb  1.3<L>  /  TBili  1.3<H>  /  DBili  x   /  AST  521<H>  /  ALT  430<H>  /  AlkPhos  29<L>  08-03    LIVER FUNCTIONS - ( 03 Aug 2020 20:57 )  Alb: 1.3 g/dL / Pro: 2.6 g/dL / ALK PHOS: 29 U/L / ALT: 430 U/L / AST: 521 U/L / GGT: x           PT/INR - ( 03 Aug 2020 20:57 )   PT: 14.9 sec;   INR: 1.26          PTT - ( 03 Aug 2020 20:57 )  PTT:54.7 sec  ABG - ( 03 Aug 2020 22:19 )  pH, Arterial: 7.42  pH, Blood: x     /  pCO2: 23    /  pO2: 271   / HCO3: 15    / Base Excess: -8.3  /  SaO2: 99        ======================Micro/Rad/Cardio=================  CXR: Reviewed  Echo:Reviewed  ======================================================  PAST MEDICAL & SURGICAL HISTORY:  Prostate cancer  History of aortic dissection  Hypertension  Essential hypertension  Hypertension  Status post endoscopic repair of thoracic aortic aneurysm (TAA)    ====================ASSESMENT ==============   08/03/2020  thoracoabdominal aortic aneurysm repair   cardiogenic   vasoplegic shock  hemodynamic instability  hypotension  Hypoxemia  Anemia blood loss  Acute oliguric renal failure  Lactic acidosis  Coagulopathy  Thrombocytopenia    Plan:    08-03-20 @ 07:01  - 08-03-20 @ 22:47  --------------------------------------------------------  IN: 4024 mL      ====================== NEUROLOGY=====================  meperidine     Injectable 25 milliGRAM(s) IV Push once   propofol drip for sedation   lumbar drain, drain CSF 10 mL every hour  ==================== RESPIRATORY======================  Mechanical Ventilation:  Mode: AC/ CMV (Assist Control/ Continuous Mandatory Ventilation)  RR (machine): 20  TV (machine): 500  FiO2: 100  PEEP: 7   titrate FiO2 down 70%  as tolerated   inhaled nitric oxide 20 PPM  ====================CARDIOVASCULAR==================   inotropic support with epinephrine titrate down and switch to dobutamine    blood pressure support with norepinephrine infusion and vasopressin infusion titrate to keep mean blood pressure above 65  ===================HEMATOLOGIC/ONC ===================   transfuse:  Packed, FFP, cryoprecipitate, platelet,   monitor plts and Hb/Hct ,  fibrinogen, coags  ===================== RENAL =========================  Wilson for monitoring urine output   right femoral hemodialysis catheter was inserted for CRRT initially 0 fluid removal then increase up to 50 mL/hour as tolerated  ==================== GASTROINTESTINAL===================  calcium chloride Injectable 1000 milliGRAM(s) IV Push once  dextrose 5%. 1000 milliLiter(s) (50 mL/Hr) IV Continuous <Continuous>  lactated ringers. 1000 milliLiter(s) (75 mL/Hr) IV Continuous <Continuous>  pantoprazole  Injectable 40 milliGRAM(s) IV Push daily   NG tube to suction    =======================    ENDOCRINE  =====================  dextrose 40% Gel 15 Gram(s) Oral once PRN Blood Glucose LESS THAN 70 milliGRAM(s)/deciliter  dextrose 50% Injectable 12.5 Gram(s) IV Push once  dextrose 50% Injectable 25 Gram(s) IV Push once  dextrose 50% Injectable 25 Gram(s) IV Push once  insulin lispro (HumaLOG) corrective regimen sliding scale   SubCutaneous Before meals and at bedtime    ========================INFECTIOUS DISEASE================  ceFAZolin   IVPB 2000 milliGRAM(s) IV Intermittent every 8 hours    Patient requires continuous monitoring with:  bedside rhythm monitoring, arterial line, pulse oximetry, ventilator management and monitoring; intermittent blood gas analysis.  Care plan discussed with ICU care team. discussed with Dr. Willson  patient remain critical; required more than usual post op care; I have spent 100  minutes providing non routine post op care, revaluated multiple times.

## 2020-08-03 NOTE — CONSULT NOTE ADULT - ASSESSMENT
Assessment/Plan:         Thank you for the opportunity to participate in the care of your patient. The nephrology service remains available to assist with any questions or concerns. Please feel free to reach us by paging the on-call nephrology fellow for urgent issues or as below.     Sidra Cook M.D.   PGY-4  Pager: [phone] Assessment/Plan:   Will initiate CVVHD. Please obtain CMP, Mag, Phos STAT. Repeat BMP, Mag, Phos q6h.  Will continue to follow this patient.    Thank you for the opportunity to participate in the care of your patient. The nephrology service remains available to assist with any questions or concerns. Please feel free to reach us by paging the on-call nephrology fellow for urgent issues or as below.     Sidra Cook M.D.   PGY-4  Pager: [phone]

## 2020-08-04 PROBLEM — C61 MALIGNANT NEOPLASM OF PROSTATE: Chronic | Status: ACTIVE | Noted: 2020-01-01

## 2020-08-04 PROBLEM — Z86.79 PERSONAL HISTORY OF OTHER DISEASES OF THE CIRCULATORY SYSTEM: Chronic | Status: ACTIVE | Noted: 2020-01-01

## 2020-08-04 PROBLEM — I10 ESSENTIAL (PRIMARY) HYPERTENSION: Chronic | Status: ACTIVE | Noted: 2020-01-01

## 2020-08-04 NOTE — CONSULT NOTE ADULT - ASSESSMENT
69 yo male with history of HTN, prostate CA s/p radiation, aortic dissection s/p endovascular repair (2011 Dr. Lewis), admitted by Dr. Willson with expanding descending aortic aneurysm, POD 1 s/p thoracoabdominal aneurysm repair, course complicated by suspected abdominal compartment syndrome. General surgery consulted intraoperatively during  exploratory laparotomy for finding of ischemic small bowel, ischemic descending colon, and ischemic rectum.    Intraoperatively, small bowel resection, left colon resection and anterior rectum resection. Bowel left in discontinuity.    Plan for second look in the OR tomorrow to re-examine small bowel that was left for viability.     Maintain care per cardiothoracic team in CTICU.   Monitor strict I/O.   Maintain NG tube to suction. NPO.  Attempt to wean pressors as tolerated.   Maintain intubation. Postoperative CXR to monitor position of ET tube.   Maintain ABthera vac at 125 mm hg continuos suction. Please contact General Surgery Team 4 immediately if suction alarms.    Please do not hesitate to call General Surgery Team 4 with any questions or concerns.

## 2020-08-04 NOTE — CONSULT NOTE ADULT - SUBJECTIVE AND OBJECTIVE BOX
GENERAL SURGERY CONSULT NOTE    HPI:  69yo M former smoker with PMHx of HTN, prostate Ca s/p radiation, aortic dissection s/p endovascular repair (2011 by Dr. Madi Lewis) who recently presented to [facility] with 8/10 cramping, non-radiating abdominal pain. CT scan done at that time demonstrated expanding descending aortic aneurysm from 7.3cm to 8.7cm at the level of the diaphragm, 6.3cm to 8.7cm at the level of the SMA and from 539cm to 7.0cm at the level of the renal arteries. Additional a new opacifies false lumen is noted anteriorly within aneurysm. Pt was encouraged to stay at [facility] for further treatment but left AMA to seek second opinion. He presented to Dr. Willson's office today 7/31 for evaluation and was referred to the ED for admission, blood pressure control and pre-surgical work up. In the ED afebrile, HR 67, /100, SpO2 97% on RA. H&H stable, WBC WNL, Cr elevated to 1.87, LFTs WNL. Pt admitted to CT surgery 9 east for strict BP control and pre-surgical work up. Pt currently endorsing continued abdominal cramping and intermittent back pain. Denies CP, SOB, N/V, LE weakness (31 Jul 2020 15:00)    SURGERY ADDENDUM:     Mr. Shelby is a 71 yo male with history of HTN, prostate CA s/p radiation, aortic dissection s/p endovascular repair (2011 Dr. Lewis), who was admitted by Dr. Willson with expanding descending aortic aneurysm from 7.3cm to 8.7 cm at diaphragm, 6.3 cm to 8.7 cm at level of SMA and 5.39 cm to 7 cm at level of renal arteries, who today is POD 1 s/p thoracoabdominal aneurysm repair. Overnight, his course was complicated by anuria, shock liver, and high anion metabolic acidosis 2/2 to lactic acidosis, requiring CVVHD. He received 18 units PRBC, 7 units FFP, 4 units platelets, factor 7, fieba and 20 units cryo in his initial operation due to coagulopathy, and postopertively overnight required additional 4 units PRBC, 3 units FFP, 1 unit platelet, and 20 units cryo. He also required increasing doses of pressors including epinephrine, vasopressin, norepinephrine and was also started on dobutamine and milrinone. Early this morning, he was noted to have high peak pressures and high bladder pressures (31 mm hg), and persistent increasing doses of pressors, concerning for abdominal compartment syndrome. He was emergently taken to the operating room by Dr. Phelps for decompression. Upon examination during exploratory laparotomy, general surgery was called for intraoperative consult due to finding of ischemic small bowel, ischemic descending colon, and boggy appearing rectum.     PMHx:   PAST MEDICAL & SURGICAL HISTORY:  Prostate cancer  History of aortic dissection  Hypertension  Essential hypertension  Hypertension  Status post endoscopic repair of thoracic aortic aneurysm (TAA)    PSHx: aortic dissection s/p endovascular repair (2011 by Dr. Madi Lewis)    ROS: Pertinent positives/negatives noted in HPI.     HOME MEDS:   Cozaar 50 mg oral tablet: 1 tab(s) orally once a day  irbesartan-hydrochlorothiazide 300mg-12.5mg oral tablet: 1 tab(s) orally once a day  metoprolol succinate 100 mg oral tablet, extended release: 1 tab(s) orally once a day  metoprolol succinate 50 mg oral tablet, extended release: 1 tab(s) orally once a day  mirtazapine 15 mg oral tablet: 1 tab(s) orally once a day (at bedtime)  NIFEdipine 90 mg oral tablet, extended release: 1 tab(s) orally once a day  Percocet 5/325 oral tablet: 1 tab(s) orally every 6 hours, As Needed pain   tamsulosin 0.4 mg oral capsule: 1 cap(s) orally once a day    ALLERGIES: NKDA  Allergies    No Known Allergies    Intolerances    SocHx: former smoker    FHx: No pertinent history in first degree relatives.   FAMILY HISTORY:     T(C): 37 (08-04-20 @ 01:00), Max: 37 (08-04-20 @ 01:00)  HR: 99 (08-04-20 @ 07:00) (97 - 121)  BP: --  RR: 22 (08-04-20 @ 07:00) (10 - 26)  SpO2: 84% (08-04-20 @ 07:00) (84% - 100%)    PHYSICAL EXAM:  Neuro: sedated  Resp: intubated, on 100% FiO2 in OR  Chest: 3 chest tubes in place  CV: sinus tachycardic prior to OR  Abd: exploratory laparotomy examination in OR reveals ischemic small bowel with portion of hyperemic bowel, ischemic left colon, ischemic rectum  : burleson in place  Extr: WWP, no edema  Lines: RIJ central line, HD catheter, right femoral a line, swan catheter       LABS:                        11.2   4.26  )-----------( 112      ( 04 Aug 2020 05:10 )             33.2     08-04    147<H>  |  110<H>  |  17  ----------------------------<  153<H>  4.2   |  17<L>  |  1.46<H>    Ca    8.6      04 Aug 2020 05:10  Phos  4.5     08-04  Mg     1.6     08-04    TPro  3.9<L>  /  Alb  2.3<L>  /  TBili  1.4<H>  /  DBili  x   /  AST  1973<H>  /  ALT  897<H>  /  AlkPhos  62  08-04    PT/INR - ( 04 Aug 2020 05:10 )   PT: 13.2 sec;   INR: 1.11          PTT - ( 04 Aug 2020 05:10 )  PTT:36.0 sec

## 2020-08-04 NOTE — BRIEF OPERATIVE NOTE - SPECIMENS
ischemic bowel, left colon, rectum
ischemic ileum, ischemic colon & rectum
none
see list
small intestine, left colon, anterior rectum
aortic aneurysm

## 2020-08-04 NOTE — PROGRESS NOTE ADULT - SUBJECTIVE AND OBJECTIVE BOX
Patient is a 70y Male seen and evaluated at bedside. CVVHD removed, pt being taken to OR for concern for abdominal compartment syndrome.    Meds:    albumin human 25% IVPB 50 every 10 minutes  BUpivacaine liposome 1.3% Injectable (no eMAR) 20 once  ceFAZolin   IVPB 2000 every 8 hours  chlorhexidine 0.12% Liquid 5 every 4 hours  chlorhexidine 0.12% Liquid 30 once  chlorhexidine 0.12% Liquid 15 every 12 hours  CRRT Treatment  <Continuous>  dextrose 40% Gel 15 once PRN  dextrose 5%. 1000 <Continuous>  dextrose 50% Injectable 12.5 once  dextrose 50% Injectable 25 once  dextrose 50% Injectable 25 once  DOBUTamine Infusion 2.5 <Continuous>  EPINEPHrine    Infusion 0.05 <Continuous>  hydrALAZINE Injectable 10 every 6 hours PRN  insulin lispro (HumaLOG) corrective regimen sliding scale  Before meals and at bedtime  insulin regular Infusion 4 <Continuous>  lactated ringers. 1000 <Continuous>  magnesium sulfate  IVPB 1 once  milrinone Bolus 1950 once  milrinone Infusion 0.2 <Continuous>  norepinephrine Infusion 0.05 <Continuous>  pantoprazole  Injectable 40 daily  piperacillin/tazobactam IVPB. 4.5 once  propofol Infusion 32.468 <Continuous>  PureFlow Dialysate RFP-401 (K 4 / Ca 3) 5000 <Continuous>  sodium chloride 0.9%. 1000 <Continuous>  vasopressin Infusion 0.05 <Continuous>      T(C): , Max: 37 (08-04-20 @ 01:00)  T(F): , Max: 98.6 (08-04-20 @ 01:00)  HR: 99 (08-04-20 @ 07:00)  BP: --  BP(mean): --  RR: 22 (08-04-20 @ 07:00)  SpO2: 84% (08-04-20 @ 07:00)  Wt(kg): --    08-03 @ 07:01  -  08-04 @ 07:00  --------------------------------------------------------  IN: 7244.5 mL / OUT: 2234 mL / NET: 5010.5 mL    08-04 @ 07:01  -  08-04 @ 08:07  --------------------------------------------------------  IN: 100 mL / OUT: 12 mL / NET: 88 mL      Height (cm): 177.8 (08-03 @ 22:47)  Weight (kg): 77 (08-03 @ 22:47)  BMI (kg/m2): 24.4 (08-03 @ 22:47)  BSA (m2): 1.95 (08-03 @ 22:47)    Review of Systems: Unable to obtain    PHYSICAL EXAM:  GENERAL: Intubated, sedated, multiple drainages  CHEST/LUNG: Bilateral clear breath sounds, no rales  HEART: Regular rate and rhythm, no murmur  ABDOMEN: Abdominal drainages, tense  EXTREMITIES: No pedal oedema      LABS:                        11.2   4.26  )-----------( 112      ( 04 Aug 2020 05:10 )             33.2     08-04    147<H>  |  110<H>  |  17  ----------------------------<  153<H>  4.2   |  17<L>  |  1.46<H>    Ca    8.6      04 Aug 2020 05:10  Phos  4.5     08-04  Mg     1.6     08-04    TPro  3.9<L>  /  Alb  2.3<L>  /  TBili  1.4<H>  /  DBili  x   /  AST  1973<H>  /  ALT  897<H>  /  AlkPhos  62  08-04    Hepatitis C Virus S/CO Ratio: 0.05 S/CO (08-03 @ 13:46)  Hepatitis C Virus Interpretation: Nonreact (08-03 @ 13:46)    PT/INR - ( 04 Aug 2020 05:10 )   PT: 13.2 sec;   INR: 1.11          PTT - ( 04 Aug 2020 05:10 )  PTT:36.0 sec          RADIOLOGY & ADDITIONAL STUDIES:

## 2020-08-04 NOTE — BRIEF OPERATIVE NOTE - OPERATION/FINDINGS
Pt placed in right lateral decubitus position, made left-sided thoracoabdominal incision. Divided diaphragm circumferentially with linear stapler. Dissected out thoracoabdominal aorta including celiac artery, SMA, and bilateral renal arteries. Pt placed on cardiopulmonary bypass. Aortic aneurysm then incised for repair, revealing copious chronic thrombus. Sewed proximally onto descending thoracic aorta with 28 mm graft, distally with 28 mm graft, 14 mm shared graft for both celiac and SMA, and 7 mm grafts for each renal artery. Hemostasis achieved. 3 chest tubes placed for drainage. Incision closed primarily in layers.
VV ECMO via right femoral venous and right internal jugular
Patient placed in right lateral decubitus position. Staples from abdominal portion of prior thoracoabdominal incision opened, and incision extended posteriorly. Ischemic small bowel, left colon, and rectum was noted. SMA examined, with palpable and doppler signal present. Graft was examined, and noted to be in place with minimal surrounding hematoma in RP space. General surgery was consulted intraoperatively for further management of ischemic bowel. Please see separate operative note for general surgery portion of procedure for bowel resection and closure with ABthera vac placement.
General Surgery consulted intraop for findings of ischemic bowel. Abdomen open via abdoinal portion of prior left thoracoabdominal incision revealing ischemic colon (rectum through descending colon) & ischemic small bowel (ileum). Splenic flexure mobilized and colon divided proximal to ischemic demarcation using DEJUAN stapler & distally at rectum using Contour stapler (green load). Mesentery divided using Ligasure Impact device. Long segment of ischemic ileum excised using DEJUAN staplers & Impact device. Remaining bowel includes 140cm from LOT, very short segment of terminal ileum including ileocecal valve, right colon, transverse colon, & short rectal stump. Patient left in discontinuity. Viability of remaining bowel will be assessed during planned second look operation after further resuscitation. Abthera temporary abdominal closure placed in OR and patient returned to CTICU.
Pt placed in partial right lateral decubitus position.  Previous thoracotomy incision opened inferiorly to level of diaphragm.  Diaphragm superiorly was left intact.  Significant intraabdominal edema.  No hematoma appreciated in retroperitoneal space.  This area was irrigated with antibiotic irrigation.  Peritoneum opened.  Significant small bowel, left colon, distal rectum ischemia.  General surgery consulted intraoperatively and resected ischemic bowel.  Please see their operative note for full details.  Abdominal wound vac placed.
Aortic clamp time: 155mins  chronic descending aorta dissection  EF 65%

## 2020-08-04 NOTE — PROGRESS NOTE ADULT - SUBJECTIVE AND OBJECTIVE BOX
NP Note Neurosurgery    HPI:  69yo M former smoker with PMHx of HTN, prostate Ca s/p radiation, aortic dissection s/p endovascular repair (2011 by Dr. Madi Lewis) who recently presented to [facility] with 8/10 cramping, non-radiating abdominal pain. CT scan done at that time demonstrated expanding descending aortic aneurysm from 7.3cm to 8.7cm at the level of the diaphragm, 6.3cm to 8.7cm at the level of the SMA and from 539cm to 7.0cm at the level of the renal arteries. Additional a new opacifies false lumen is noted anteriorly within aneurysm. Pt was encouraged to stay at [facility] for further treatment but left AMA to seek second opinion. He presented to Dr. Willson's office today 7/31 for evaluation and was referred to the ED for admission, blood pressure control and pre-surgical work up. In the ED afebrile, HR 67, /100, SpO2 97% on RA. H&H stable, WBC WNL, Cr elevated to 1.87, LFTs WNL. Pt admitted to CT surgery 9 east for strict BP control and pre-surgical work up. Pt currently endorsing continued abdominal cramping and intermittent back pain. Denies CP, SOB, N/V, LE weakness (31 Jul 2020 15:00)    OVERNIGHT EVENTS:  Vital Signs Last 24 Hrs  T(C): 32.2 (04 Aug 2020 14:00), Max: 37 (04 Aug 2020 01:00)  T(F): 90 (04 Aug 2020 14:00), Max: 98.6 (04 Aug 2020 01:00)  HR: 82 (04 Aug 2020 17:00) (59 - 121)  BP: --  BP(mean): --  RR: 18 (04 Aug 2020 17:00) (10 - 26)  SpO2: 88% (04 Aug 2020 17:00) (79% - 100%)    I&O's Summary    03 Aug 2020 07:01  -  04 Aug 2020 07:00  --------------------------------------------------------  IN: 7244.5 mL / OUT: 2234 mL / NET: 5010.5 mL    04 Aug 2020 07:01  -  04 Aug 2020 17:29  --------------------------------------------------------  IN: 3680.2 mL / OUT: 3343 mL / NET: 337.2 mL        PHYSICAL EXAM:  Neurological:    intubated CARLOS A  sedated  antigravity  LD 10cc/hr        DIET: NPO        LABS:                        11.1   1.65  )-----------( 80       ( 04 Aug 2020 15:27 )             32.1     08-04    145  |  107  |  16  ----------------------------<  148<H>  3.7   |  20<L>  |  1.36<H>    Ca    9.7      04 Aug 2020 15:27  Phos  3.4     08-04  Mg     2.8     08-04    TPro  4.0<L>  /  Alb  2.8<L>  /  TBili  2.2<H>  /  DBili  x   /  AST  2105<H>  /  ALT  564<H>  /  AlkPhos  72  08-04    PT/INR - ( 04 Aug 2020 15:27 )   PT: 16.3 sec;   INR: 1.38          PTT - ( 04 Aug 2020 15:27 )  PTT:36.6 sec    CARDIAC MARKERS ( 04 Aug 2020 05:10 )  x     / 0.67 ng/mL / 1288 U/L / x     / 25.6 ng/mL      CAPILLARY BLOOD GLUCOSE      POCT Blood Glucose.: 97 mg/dL (04 Aug 2020 12:51)  POCT Blood Glucose.: 142 mg/dL (04 Aug 2020 06:13)  POCT Blood Glucose.: 133 mg/dL (04 Aug 2020 04:18)  POCT Blood Glucose.: 143 mg/dL (04 Aug 2020 03:36)  POCT Blood Glucose.: 149 mg/dL (04 Aug 2020 02:51)  POCT Blood Glucose.: 171 mg/dL (04 Aug 2020 01:06)  POCT Blood Glucose.: 180 mg/dL (04 Aug 2020 00:08)  POCT Blood Glucose.: 202 mg/dL (03 Aug 2020 22:56)  POCT Blood Glucose.: 224 mg/dL (03 Aug 2020 21:54)      Drug Levels: [] N/A    CSF Analysis: [] N/A      Allergies    No Known Allergies    Intolerances      MEDICATIONS:  Antibiotics:  fluconAZOLE IVPB      metroNIDAZOLE  IVPB 500 milliGRAM(s) IV Intermittent every 8 hours  piperacillin/tazobactam IVPB.. 4.5 Gram(s) IV Intermittent every 6 hours    Neuro:  cisatracurium Infusion 3 MICROgram(s)/kG/Min IV Continuous <Continuous>  fentaNYL   Infusion.... 0.5 MICROgram(s)/kG/Hr IV Continuous <Continuous>  propofol Infusion 32.468 MICROgram(s)/kG/Min IV Continuous <Continuous>    Anticoagulation:    OTHER:  angiotensin II Infusion 20 NANOGram(s)/kG/Min IV Continuous <Continuous>  chlorhexidine 0.12% Liquid 5 milliLiter(s) Oral Mucosa every 4 hours  chlorhexidine 2% Cloths 1 Application(s) Topical <User Schedule>  chlorhexidine 4% Liquid 1 Application(s) Topical <User Schedule>  CRRT Treatment    <Continuous>  dextrose 40% Gel 15 Gram(s) Oral once PRN  dextrose 50% Injectable 12.5 Gram(s) IV Push once  dextrose 50% Injectable 25 Gram(s) IV Push once  dextrose 50% Injectable 25 Gram(s) IV Push once  EPINEPHrine    Infusion 0.05 MICROgram(s)/kG/Min IV Continuous <Continuous>  hydrocortisone sodium succinate Injectable 100 milliGRAM(s) IV Push every 8 hours  insulin lispro (HumaLOG) corrective regimen sliding scale   SubCutaneous every 6 hours  insulin regular Infusion 4 Unit(s)/Hr IV Continuous <Continuous>  norepinephrine Infusion 0.05 MICROgram(s)/kG/Min IV Continuous <Continuous>  pantoprazole  Injectable 40 milliGRAM(s) IV Push daily  PureFlow Dialysate RFP-401 (K 4 / Ca 3) 5000 milliLiter(s) CRRT <Continuous>  vasopressin Infusion 0.05 Unit(s)/Min IV Continuous <Continuous>    IVF:  albumin human  5% IVPB 250 milliLiter(s) IV Intermittent every 1 hour  albumin human 25% IVPB 50 milliLiter(s) IV Intermittent every 10 minutes  dextrose 5%. 1000 milliLiter(s) IV Continuous <Continuous>  lactated ringers. 1000 milliLiter(s) IV Continuous <Continuous>  sodium chloride 0.9%. 1000 milliLiter(s) IV Continuous <Continuous>    CULTURES:    RADIOLOGY & ADDITIONAL TESTS:      ASSESSMENT:  70y Male s/p insertion Lumbar drain prior to procedure    PLAN:  NEURO:    Monitor neuro status  Monitor lumbar hourly output  Continue current medical regime as per Primary team    Dispo: Will continue to monitor    [

## 2020-08-04 NOTE — PROGRESS NOTE ADULT - SUBJECTIVE AND OBJECTIVE BOX
CTICU  CRITICAL  CARE  attending     Hand off received 					   Pertinent clinical, laboratory, radiographic, hemodynamic, echocardiographic, respiratory data, microbiologic data and chart were reviewed and analyzed frequently throughout the course of the day and night  Patient seen and examined with CTS/ SH attending at bedside  Pt is a 70y , Male, HEALTH ISSUES - PROBLEM Dx:      , FAMILY HISTORY:  PAST MEDICAL & SURGICAL HISTORY:  Prostate cancer  History of aortic dissection  Hypertension  Essential hypertension  Hypertension  Status post endoscopic repair of thoracic aortic aneurysm (TAA)    Patient is a 70y old  Male who presents with a chief complaint of Thoracoabdominal aneurysm (04 Aug 2020 13:36)      14 system review was unremarkable    Vital signs, hemodynamic and respiratory parameters were reviewed from the bedside nursing flowsheet.  ICU Vital Signs Last 24 Hrs  T(C): 32.2 (04 Aug 2020 14:00), Max: 37 (04 Aug 2020 01:00)  T(F): 90 (04 Aug 2020 14:00), Max: 98.6 (04 Aug 2020 01:00)  HR: 59 (04 Aug 2020 15:00) (59 - 121)  BP: --  BP(mean): --  ABP: 108/60 (04 Aug 2020 15:00) (81/49 - 155/84)  ABP(mean): 75 (04 Aug 2020 15:00) (56 - 105)  RR: 22 (04 Aug 2020 15:00) (10 - 26)  SpO2: 79% (04 Aug 2020 15:00) (79% - 100%)    Adult Advanced Hemodynamics Last 24 Hrs  CVP(mm Hg): --  CVP(cm H2O): --  CO: 4 (04 Aug 2020 14:00) (4 - 8.1)  CI: 2 (04 Aug 2020 14:00) (2 - 4.1)  PA: 39/25 (04 Aug 2020 15:00) (38/19 - 41/25)  PA(mean): 32 (04 Aug 2020 15:00) (30 - 34)  PCWP: --  SVR: 919 (04 Aug 2020 14:00) (919 - 919)  SVRI: --  PVR: --  PVRI: --, ABG - ( 04 Aug 2020 12:55 )  pH, Arterial: 7.40  pH, Blood: x     /  pCO2: 32    /  pO2: 73    / HCO3: 19    / Base Excess: -4.8  /  SaO2: 95                Mode: AC/ CMV (Assist Control/ Continuous Mandatory Ventilation)  RR (machine): 24  TV (machine): 500  FiO2: 100  PEEP: 8  ITime: 1  MAP: 20  PIP: 42    Intake and output was reviewed and the fluid balance was calculated  Daily Height in cm: 177.8 (03 Aug 2020 22:47)    Daily   I&O's Summary    03 Aug 2020 07:01  -  04 Aug 2020 07:00  --------------------------------------------------------  IN: 7244.5 mL / OUT: 2234 mL / NET: 5010.5 mL    04 Aug 2020 07:01  -  04 Aug 2020 15:08  --------------------------------------------------------  IN: 3309.1 mL / OUT: 2344 mL / NET: 965.1 mL        All lines and drain sites were assessed  [facility]      POCT Blood Glucose.: 97 mg/dL (04 Aug 2020 12:51)    No acute change in mental status  Auscultation of the chest reveals equal bs  Abdomen is soft  Extremities are warm and well perfused  Wounds appear clean and unremarkable  Antibiotics are periop    labs  CBC Full  -  ( 04 Aug 2020 13:01 )  WBC Count : 1.68 K/uL  RBC Count : 3.60 M/uL  Hemoglobin : 10.6 g/dL  Hematocrit : 31.5 %  Platelet Count - Automated : 91 K/uL  Mean Cell Volume : 87.5 fl  Mean Cell Hemoglobin : 29.4 pg  Mean Cell Hemoglobin Concentration : 33.7 gm/dL  Auto Neutrophil # : x  Auto Lymphocyte # : x  Auto Monocyte # : x  Auto Eosinophil # : x  Auto Basophil # : x  Auto Neutrophil % : x  Auto Lymphocyte % : x  Auto Monocyte % : x  Auto Eosinophil % : x  Auto Basophil % : x    08-04    149<H>  |  108  |  16  ----------------------------<  100<H>  3.7   |  18<L>  |  1.47<H>    Ca    10.0      04 Aug 2020 13:01  Phos  4.1     08-04  Mg     1.4     08-04    TPro  3.7<L>  /  Alb  2.8<L>  /  TBili  1.5<H>  /  DBili  x   /  AST  1698<H>  /  ALT  528<H>  /  AlkPhos  60  08-04    PT/INR - ( 04 Aug 2020 13:01 )   PT: 17.3 sec;   INR: 1.47          PTT - ( 04 Aug 2020 13:01 )  PTT:37.6 sec  The current medications were reviewed   MEDICATIONS  (STANDING):  albumin human  5% IVPB 250 milliLiter(s) IV Intermittent every 1 hour  albumin human 25% IVPB 50 milliLiter(s) IV Intermittent every 10 minutes  angiotensin II Infusion 20 NANOGram(s)/kG/Min (9.24 mL/Hr) IV Continuous <Continuous>  chlorhexidine 0.12% Liquid 5 milliLiter(s) Oral Mucosa every 4 hours  chlorhexidine 2% Cloths 1 Application(s) Topical <User Schedule>  chlorhexidine 4% Liquid 1 Application(s) Topical <User Schedule>  cisatracurium Infusion 3 MICROgram(s)/kG/Min (13.9 mL/Hr) IV Continuous <Continuous>  CRRT Treatment    <Continuous>  dextrose 5%. 1000 milliLiter(s) (50 mL/Hr) IV Continuous <Continuous>  dextrose 50% Injectable 12.5 Gram(s) IV Push once  dextrose 50% Injectable 25 Gram(s) IV Push once  dextrose 50% Injectable 25 Gram(s) IV Push once  EPINEPHrine    Infusion 0.05 MICROgram(s)/kG/Min (7.22 mL/Hr) IV Continuous <Continuous>  fentaNYL   Infusion.... 0.5 MICROgram(s)/kG/Hr (0.77 mL/Hr) IV Continuous <Continuous>  fluconAZOLE IVPB      hydrocortisone sodium succinate Injectable 100 milliGRAM(s) IV Push every 8 hours  insulin lispro (HumaLOG) corrective regimen sliding scale   SubCutaneous every 6 hours  insulin regular Infusion 4 Unit(s)/Hr (4 mL/Hr) IV Continuous <Continuous>  lactated ringers. 1000 milliLiter(s) (75 mL/Hr) IV Continuous <Continuous>  metroNIDAZOLE  IVPB 500 milliGRAM(s) IV Intermittent every 8 hours  norepinephrine Infusion 0.05 MICROgram(s)/kG/Min (3.61 mL/Hr) IV Continuous <Continuous>  pantoprazole  Injectable 40 milliGRAM(s) IV Push daily  piperacillin/tazobactam IVPB.. 4.5 Gram(s) IV Intermittent every 6 hours  propofol Infusion 32.468 MICROgram(s)/kG/Min (15 mL/Hr) IV Continuous <Continuous>  PureFlow Dialysate RFP-401 (K 4 / Ca 3) 5000 milliLiter(s) (2500 mL/Hr) CRRT <Continuous>  sodium chloride 0.9%. 1000 milliLiter(s) (10 mL/Hr) IV Continuous <Continuous>  vasopressin Infusion 0.05 Unit(s)/Min (3 mL/Hr) IV Continuous <Continuous>    MEDICATIONS  (PRN):  dextrose 40% Gel 15 Gram(s) Oral once PRN Blood Glucose LESS THAN 70 milliGRAM(s)/deciliter       PROBLEM LIST/ ASSESSMENT:  HEALTH ISSUES - PROBLEM Dx:      ,   Patient is a 70y old  Male who presents with a chief complaint of Thoracoabdominal aneurysm (04 Aug 2020 13:36)     s/p cardiac surgery  shock  severe sepsis  multiorgan failure  ischemic bowel                My plan includes :  close hemodynamic, ventilatory and drain monitoring and management per post op routine    Monitor for arrhythmias and monitor parameters for organ perfusion  beta blockade not administered due to hemodynamic instability and bradycardia  monitor neurologic status  Head of the bed should remain elevated to 45 deg .   chest PT and IS will be encouraged  monitor adequacy of oxygenation and ventilation and attempt to wean oxygen  antibiotic regimen will be tailored to the clinical, laboratory and microbiologic data  Nutritional goals will be met using po eventually , ensure adequate caloric intake and montior the same  Stress ulcer and VTE prophylaxis will be achieved    Glycemic control is satisfactory  Electrolytes have been repleted as necessary and wound care has been carried out. Pain control has been achieved.   agressive physical therapy and early mobility and ambulation goals will be met   The family was updated about the course and plan  CRITICAL CARE TIME personally provided by me  in evaluation and management, reassessments, review and interpretation of labs and x-rays, ventilator and hemodynamic management, formulating a plan and coordinating care: ___90____ MIN.  Time does not include procedural time. Time spent was non routine post-operarive caRE and included multiple and repeated evaluations at the bedside  CTICU ATTENDING     					    Alok Bledsoe MD

## 2020-08-04 NOTE — BRIEF OPERATIVE NOTE - IV INFUSIONS - BLOOD PRODUCTS
4 units PRBC
18unit PRBC, 7unit FFP, 4unit platelet, factor 7, fieba 1000unit amd cryo 20units
5
5 PRBC
18unit PRBC, 7unit FFP, 4unit platelet, factor 7, fieba 1000unit and cryo 20units

## 2020-08-04 NOTE — BRIEF OPERATIVE NOTE - NSICDXBRIEFPREOP_GEN_ALL_CORE_FT
PRE-OP DIAGNOSIS:  Thoracoabdominal aortic aneurysm 03-Aug-2020 21:26:34  Emmanuel Francisco
PRE-OP DIAGNOSIS:  Respiratory failure, acute 04-Aug-2020 21:25:37  Janene Loyd
PRE-OP DIAGNOSIS:  Abdominal compartment syndrome, nontraumatic 04-Aug-2020 11:02:47  Farzaneh Garza
PRE-OP DIAGNOSIS:  Large bowel ischemia 04-Aug-2020 12:04:59  Sadie Troy  Small bowel ischemia 04-Aug-2020 12:04:44  Sadie Troy
PRE-OP DIAGNOSIS:  Abdominal compartment syndrome, nontraumatic 04-Aug-2020 11:27:56  Dimitri Campa  Cardiogenic postoperative shock 04-Aug-2020 11:27:45  Dimitri Campa
PRE-OP DIAGNOSIS:  Thoracoabdominal aortic aneurysm 03-Aug-2020 21:26:34  Emmanuel Francisco

## 2020-08-04 NOTE — PROGRESS NOTE ADULT - SUBJECTIVE AND OBJECTIVE BOX
STATUS POST:  Thoracoabdominal repair of descending aorta replacement     SUBJECTIVE: POD#1. Patient seen and examined bedside by team. He underwent open thoracoabdominal repair of descending aorta. Patient remained intubated on full vent support overnight and on pressors and inotropes. He is on acute renal failure and is on CRRT. Patient remained hypotensive this morning despite being on pressors, with increased CT output, and on exam his abdomen was tense. He was taken to the OR this morning and significant small bowel, left colon, distal rectum ischemia was noted. General surgery consulted intraoperatively and resected ischemic bowel. Bowel was left in discontinuity. His abdomen was left open with Abthera in place, and patient returned in CTICU.    DOBUTamine Infusion 2.5 MICROgram(s)/kG/Min IV Continuous <Continuous>  EPINEPHrine    Infusion 0.05 MICROgram(s)/kG/Min IV Continuous <Continuous>  fluconAZOLE IVPB 400 milliGRAM(s) IV Intermittent once  fluconAZOLE IVPB      hydrALAZINE Injectable 10 milliGRAM(s) IV Push every 6 hours PRN  metroNIDAZOLE  IVPB 500 milliGRAM(s) IV Intermittent every 8 hours  milrinone Bolus 1950 MICROGram(s) IV Bolus once  milrinone Infusion 0.2 MICROgram(s)/kG/Min IV Continuous <Continuous>  norepinephrine Infusion 0.05 MICROgram(s)/kG/Min IV Continuous <Continuous>  piperacillin/tazobactam IVPB. 4.5 Gram(s) IV Intermittent once  piperacillin/tazobactam IVPB.. 4.5 Gram(s) IV Intermittent every 6 hours  vancomycin  IVPB 1250 milliGRAM(s) IV Intermittent once  vancomycin  IVPB 1000 milliGRAM(s) IV Intermittent every 12 hours      Vital Signs Last 24 Hrs  T(C): 32.5 (04 Aug 2020 11:00), Max: 37 (04 Aug 2020 01:00)  T(F): 90.5 (04 Aug 2020 11:00), Max: 98.6 (04 Aug 2020 01:00)  HR: 93 (04 Aug 2020 12:15) (83 - 121)  BP: --  BP(mean): --  RR: 24 (04 Aug 2020 12:15) (10 - 26)  SpO2: 94% (04 Aug 2020 12:15) (84% - 100%)  I&O's Detail    03 Aug 2020 07:01  -  04 Aug 2020 07:00  --------------------------------------------------------  IN:    Albumin 5%  - 250 mL: 350 mL    Cryoprecipitate: 486 mL    DOBUTamine Infusion: 29.5 mL    EPINEPHrine Infusion: 178.8 mL    insulin regular Infusion: 36 mL    IV PiggyBack: 350 mL    lactated ringers.: 2000 mL    milrinone  Infusion: 18.4 mL    norepinephrine Infusion: 176.2 mL    Packed Red Blood Cells: 1400 mL    Plasma: 708 mL    Platelets - Single Donor: 224 mL    propofol Infusion: 127.6 mL    sodium chloride 0.9%.: 1100 mL    vasopressin Infusion: 60 mL  Total IN: 7244.5 mL    OUT:    Bulb: 600 mL    Chest Tube: 620 mL    Chest Tube: 90 mL    Chest Tube: 100 mL    Drain: 130 mL    Indwelling Catheter - Urethral: 100 mL    Nasoenteral Tube: 150 mL    Other: 444 mL  Total OUT: 2234 mL    Total NET: 5010.5 mL      04 Aug 2020 07:01  -  04 Aug 2020 12:20  --------------------------------------------------------  IN:    Albumin 5%  - 250 mL: 1000 mL    EPINEPHrine Infusion: 41.6 mL    norepinephrine Infusion: 46.4 mL    Packed Red Blood Cells: 300 mL    sodium chloride 0.9%.: 300 mL    vasopressin Infusion: 16 mL  Total IN: 1704 mL    OUT:    Chest Tube: 70 mL    Chest Tube: 65 mL    Chest Tube: 70 mL    Drain: 24 mL    Indwelling Catheter - Urethral: 5 mL    Other: 12 mL    VAC (Vacuum Assisted Closure) System: 500 mL  Total OUT: 746 mL    Total NET: 958 mL          Physical Exam:  General: Patient sedated, in acute distress  C/V: on pressors, sinus rhythm  Pulm: On mechanical ventilation (AC: 24/500/8/100%)  Abd: soft, left open, Abthera in place  Extrem: warm, dopplerable PT, DP bilaterally, Dialysis catheter in R fem      LABS:                        10.5   2.03  )-----------( 54       ( 04 Aug 2020 11:20 )             31.6     08-04    150<H>  |  110<H>  |  17  ----------------------------<  97  4.2   |  20<L>  |  1.69<H>    Ca    11.4<H>      04 Aug 2020 11:20  Phos  5.4     08-04  Mg     1.4     08-04    TPro  2.6<L>  /  Alb  1.7<L>  /  TBili  1.0  /  DBili  x   /  AST  1771<H>  /  ALT  584<H>  /  AlkPhos  61  08-04    PT/INR - ( 04 Aug 2020 11:20 )   PT: 18.7 sec;   INR: 1.59          PTT - ( 04 Aug 2020 11:20 )  PTT:41.3 sec

## 2020-08-04 NOTE — PROGRESS NOTE ADULT - SUBJECTIVE AND OBJECTIVE BOX
Vascular Surgery Post-Op Note    Procedure: Exploratory laparotomy, small bowel resection    Diagnosis/Indication: Abdominal compartment syndrome    Surgeon: Dr. Flynn, Dr. Willson    S: Pt has no complaints. Denies CP, SOB, KEY, calf tenderness. Pain controlled with medication.    O:  T(C): 32.2 (08-04-20 @ 14:00), Max: 32.2 (08-04-20 @ 14:00)  T(F): 90 (08-04-20 @ 14:00), Max: 90 (08-04-20 @ 14:00)  HR: 59 (08-04-20 @ 15:00) (59 - 93)  BP: --  RR: 22 (08-04-20 @ 15:00) (22 - 24)  SpO2: 79% (08-04-20 @ 15:00) (79% - 98%)  Wt(kg): --                        10.6   1.68  )-----------( 91       ( 04 Aug 2020 13:01 )             31.5     08-04    149<H>  |  108  |  16  ----------------------------<  100<H>  3.7   |  18<L>  |  1.47<H>    Ca    10.0      04 Aug 2020 13:01  Phos  4.1     08-04  Mg     1.4     08-04    TPro  3.7<L>  /  Alb  2.8<L>  /  TBili  1.5<H>  /  DBili  x   /  AST  1698<H>  /  ALT  528<H>  /  AlkPhos  60  08-04      Gen: NAD. Intubated and not responsive to command.   C/V: MAP in 80s. NSR on monitor.   Pulm: Nonlabored breathing, no respiratory distress. Patient is intubated  Abd: soft, NT/ND. Abdominal wound vac in place with good suction.   Extrem: WWP, lower extremities with moderate edema bilaterally.      A/P: 70yMale s/p above procedure  Diet: None  IVF: NS, LR, Albumin,   Pain/nausea control: None   DVT ppx: SQH  Dispo plan: ICU tonight and return to OR tomorrow for second part of operation

## 2020-08-04 NOTE — PROGRESS NOTE ADULT - SUBJECTIVE AND OBJECTIVE BOX
CTICU  CRITICAL  CARE  PROCEDURE  NOTE      				     Name of procedure – Flexible fiberoptic bronchoscopy      Flexible fiberoptic bronchoscopy was performed under propofol and fentanyl sedation while the patient was intubated for controlled mechanical ventilation  Pre-procedural assessment reveals no risk of Tb  Ventilator settings were adjusted to reduce airway pressures to reduce the risk of ptx  The scope was advanced past the ett which was noted to be 2 cm the augie   The augie was sharp  Right LL and RML air way were patent ,   Lavaged and sent for culture  Left LL air way    CXR confirmed no pneumothorax post bronch  There were no complications  The patient tolerated the procedure well

## 2020-08-04 NOTE — BRIEF OPERATIVE NOTE - NSICDXBRIEFPOSTOP_GEN_ALL_CORE_FT
POST-OP DIAGNOSIS:  Thoracoabdominal aortic aneurysm 03-Aug-2020 21:26:51  Emmanuel Francisco
POST-OP DIAGNOSIS:  Failure respiratory 04-Aug-2020 21:25:52  Janene Loyd
POST-OP DIAGNOSIS:  Ischemic bowel disease 04-Aug-2020 11:03:03  Farzaneh Garza
POST-OP DIAGNOSIS:  Large bowel ischemia 04-Aug-2020 12:06:03  Sadie Troy  Small bowel ischemia 04-Aug-2020 12:05:43  Sadie Troy
POST-OP DIAGNOSIS:  Ischemic bowel disease 04-Aug-2020 11:28:52  Dimitri Campa
POST-OP DIAGNOSIS:  Thoracoabdominal aortic aneurysm 03-Aug-2020 21:26:51  Emmanuel Francisco

## 2020-08-04 NOTE — CHART NOTE - NSCHARTNOTEFT_GEN_A_CORE
Patient seen and examined at bedside. Patient s/p VV ECMO for ARDS induced hypoxia. Noted lactate increasing, 10-11. Pertinent laboratory, radiographic, hemodynamic, respiratory data reviewed. Abthera temporary closure device remains. Plan for second look operation, and evaluation of bowel is tenuous given patient's HD instability.

## 2020-08-04 NOTE — BRIEF OPERATIVE NOTE - NSICDXBRIEFPROCEDURE_GEN_ALL_CORE_FT
PROCEDURES:  Repair, aneurysm, thoracoabdominal aorta 03-Aug-2020 21:27:06  Emmanuel Francisco
PROCEDURES:  Insertion, cannula, percutaneous, adult, for ECMO 04-Aug-2020 21:24:03 VV ECMO Janene Loyd
PROCEDURES:  Exploratory laparotomy 04-Aug-2020 11:02:15  Farzaneh Garza
PROCEDURES:  Temporary closure of abdominal cavity 04-Aug-2020 12:04:10  Sadie Troy  Mobilization, splenic flexure, with partial colectomy 04-Aug-2020 12:03:50  Sadie Troy  Small bowel resection 04-Aug-2020 11:26:43  Dimitri Campa  Exploratory laparotomy 04-Aug-2020 11:02:15  Farzaneh Garza
PROCEDURES:  Small bowel resection 04-Aug-2020 11:26:43  Dimitri Campa  Exploratory laparotomy 04-Aug-2020 11:02:15  Farzaneh Garza
PROCEDURES:  Repair, aneurysm, thoracoabdominal aorta 03-Aug-2020 21:27:06  Emmanuel Francisco

## 2020-08-04 NOTE — PROCEDURE NOTE - NSINDICATIONS_GEN_A_CORE
critical illness
critical illness/venous access/dialysis/CRRT
monitoring purposes/cannulation purposes

## 2020-08-04 NOTE — PROGRESS NOTE ADULT - SUBJECTIVE AND OBJECTIVE BOX
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  ICU Vital Signs Last 24 Hrs  T(C): 32.2 (04 Aug 2020 14:00), Max: 37 (04 Aug 2020 01:00)  T(F): 90 (04 Aug 2020 14:00), Max: 98.6 (04 Aug 2020 01:00)  HR: 101 (04 Aug 2020 22:00) (59 - 125)  BP: --  BP(mean): --  ABP: 111/90 (04 Aug 2020 22:00) (81/49 - 161/90)  ABP(mean): 107 (04 Aug 2020 22:00) (56 - 110)  RR: 14 (04 Aug 2020 23:00) (14 - 24)  SpO2: 99% (04 Aug 2020 22:00) (79% - 100%)    Adult Advanced Hemodynamics Last 24 Hrs  CVP(mm Hg): --  CVP(cm H2O): --  CO: 4 (04 Aug 2020 14:00) (4 - 8.1)  CI: 2 (04 Aug 2020 14:00) (2 - 4.1)  PA: 8/8 (04 Aug 2020 21:00) (8/8 - 45/30)  PA(mean): 8 (04 Aug 2020 21:00) (8 - 39)  PCWP: --  SVR: 919 (04 Aug 2020 14:00) (919 - 919)  SVRI: --  PVR: --  PVRI: --, ABG - ( 04 Aug 2020 21:50 )  pH, Arterial: 7.42  pH, Blood: x     /  pCO2: 22    /  pO2: 287   / HCO3: 14    / Base Excess: -9.2  /  SaO2: 99                Mode: AC/ CMV (Assist Control/ Continuous Mandatory Ventilation)  RR (machine): 18  TV (machine): 550  FiO2: 100  PEEP: 14  ITime: 1.7  MAP: 24  PIP: 36    Intake and output was reviewed and the fluid balance was calculated  Daily     Daily   I&O's Summary    03 Aug 2020 07:01  -  04 Aug 2020 07:00  --------------------------------------------------------  IN: 7244.5 mL / OUT: 2234 mL / NET: 5010.5 mL    04 Aug 2020 07:01  -  04 Aug 2020 23:14  --------------------------------------------------------  IN: 4999.4 mL / OUT: 4603 mL / NET: 396.4 mL        All lines and drain sites were assessed  [facility]      POCT Blood Glucose.: 97 mg/dL (04 Aug 2020 12:51)    No acute change in mental status  Auscultation of the chest reveals equal bs  Abdomen is soft  Extremities are warm and well perfused  Wounds appear clean and unremarkable  Antibiotics are periop    labs  CBC Full  -  ( 04 Aug 2020 21:52 )  WBC Count : 3.70 K/uL  RBC Count : 3.00 M/uL  Hemoglobin : 9.1 g/dL  Hematocrit : 26.7 %  Platelet Count - Automated : 53 K/uL  Mean Cell Volume : 89.0 fl  Mean Cell Hemoglobin : 30.3 pg  Mean Cell Hemoglobin Concentration : 34.1 gm/dL  Auto Neutrophil # : x  Auto Lymphocyte # : x  Auto Monocyte # : x  Auto Eosinophil # : x  Auto Basophil # : x  Auto Neutrophil % : x  Auto Lymphocyte % : x  Auto Monocyte % : x  Auto Eosinophil % : x  Auto Basophil % : x    08-04    146<H>  |  110<H>  |  17  ----------------------------<  133<H>  4.3   |  14<L>  |  1.48<H>    Ca    9.2      04 Aug 2020 21:52  Phos  7.2     08-04  Mg     2.2     08-04    TPro  2.2<L>  /  Alb  1.5<L>  /  TBili  1.9<H>  /  DBili  x   /  AST  1318<H>  /  ALT  282<H>  /  AlkPhos  52  08-04    PT/INR - ( 04 Aug 2020 21:52 )   PT: 24.1 sec;   INR: 2.08          PTT - ( 04 Aug 2020 21:52 )  PTT:196.3 sec  The current medications were reviewed   MEDICATIONS  (STANDING):  albumin human 25% IVPB 50 milliLiter(s) IV Intermittent every 10 minutes  angiotensin II Infusion 20 NANOGram(s)/kG/Min (9.24 mL/Hr) IV Continuous <Continuous>  chlorhexidine 0.12% Liquid 5 milliLiter(s) Oral Mucosa every 4 hours  chlorhexidine 2% Cloths 1 Application(s) Topical <User Schedule>  chlorhexidine 4% Liquid 1 Application(s) Topical <User Schedule>  cisatracurium Infusion 3 MICROgram(s)/kG/Min (13.9 mL/Hr) IV Continuous <Continuous>  CRRT Treatment    <Continuous>  dextrose 5%. 1000 milliLiter(s) (50 mL/Hr) IV Continuous <Continuous>  dextrose 50% Injectable 12.5 Gram(s) IV Push once  dextrose 50% Injectable 25 Gram(s) IV Push once  dextrose 50% Injectable 25 Gram(s) IV Push once  EPINEPHrine    Infusion 0.05 MICROgram(s)/kG/Min (7.22 mL/Hr) IV Continuous <Continuous>  fentaNYL   Infusion.... 0.5 MICROgram(s)/kG/Hr (0.77 mL/Hr) IV Continuous <Continuous>  fluconAZOLE IVPB      hydrocortisone sodium succinate Injectable 100 milliGRAM(s) IV Push every 8 hours  insulin lispro (HumaLOG) corrective regimen sliding scale   SubCutaneous every 6 hours  insulin regular Infusion 4 Unit(s)/Hr (4 mL/Hr) IV Continuous <Continuous>  lactated ringers. 1000 milliLiter(s) (75 mL/Hr) IV Continuous <Continuous>  metroNIDAZOLE  IVPB 500 milliGRAM(s) IV Intermittent every 8 hours  norepinephrine Infusion 0.05 MICROgram(s)/kG/Min (3.61 mL/Hr) IV Continuous <Continuous>  pantoprazole  Injectable 40 milliGRAM(s) IV Push daily  phenylephrine    Infusion 0.3 MICROgram(s)/kG/Min (4.33 mL/Hr) IV Continuous <Continuous>  phytonadione  IVPB 10 milliGRAM(s) IV Intermittent once  piperacillin/tazobactam IVPB.. 4.5 Gram(s) IV Intermittent every 6 hours  propofol Infusion 32.468 MICROgram(s)/kG/Min (15 mL/Hr) IV Continuous <Continuous>  PureFlow Dialysate RFP-401 (K 4 / Ca 3) 5000 milliLiter(s) (2500 mL/Hr) CRRT <Continuous>  sodium chloride 0.9%. 1000 milliLiter(s) (10 mL/Hr) IV Continuous <Continuous>  vasopressin Infusion 0.05 Unit(s)/Min (3 mL/Hr) IV Continuous <Continuous>    MEDICATIONS  (PRN):  dextrose 40% Gel 15 Gram(s) Oral once PRN Blood Glucose LESS THAN 70 milliGRAM(s)/deciliter       PROBLEM LIST/ ASSESSMENT:  HEALTH ISSUES - PROBLEM Dx:      ,   Patient is a 70y old  Male who presents with a chief complaint of Thoracoabdominal aneurysm (04 Aug 2020 17:29)      on VV ECMO    ECMO daily mgmt included monitoring flows currently at 2.9 lpm, exit and entry cannulae sites, distal pulses, adjusting ecmo fio2 and sweep gases, monitoring for circuit thrombosis, montioring for pressure gradients , ecmo checklist carried out, antocoagulation protocols on heparin gtt contraindicated due to intraspinal drain            My plan includes :  close hemodynamic, ventilatory and drain monitoring and management per post op routine    Monitor for arrhythmias and monitor parameters for organ perfusion  beta blockade not administered due to hemodynamic instability and bradycardia  monitor neurologic status  Head of the bed should remain elevated to 45 deg .   chest PT and IS will be encouraged  monitor adequacy of oxygenation and ventilation and attempt to wean oxygen  antibiotic regimen will be tailored to the clinical, laboratory and microbiologic data  Nutritional goals will be met using po eventually , ensure adequate caloric intake and montior the same  Stress ulcer and VTE prophylaxis will be achieved    Glycemic control is satisfactory  Electrolytes have been repleted as necessary and wound care has been carried out. Pain control has been achieved.   agressive physical therapy and early mobility and ambulation goals will be met   The family was updated about the course and plan  CRITICAL CARE TIME personally provided by me  in evaluation and management, reassessments, review and interpretation of labs and x-rays, ventilator and hemodynamic management, formulating a plan and coordinating care: ___90____ MIN.  Time does not include procedural time.  CTICU ATTENDING     					    Alok Bledsoe MD

## 2020-08-04 NOTE — PROGRESS NOTE ADULT - SUBJECTIVE AND OBJECTIVE BOX
Patient was seen and evaluated on dialysis.     Hemodynamically stable on 4 pressors, goal MAP 90 or higher to maintain spinal perfusion post-op. Unlikely to tolerate hemodialysis given pressor requirements.     CVVHD:   QB: 250 mL/min   Dialysis Fluid Rate: 2.5 L/h   UF set to achieve net negative 100 mL/hr     Patient is tolerating the procedure well.   Continue CVVHD treatment as prescribed.
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
## 2020-08-04 NOTE — BRIEF OPERATIVE NOTE - ANTIBIOTIC PROTOCOL
Followed protocol
Followed protocol
Followed protocol/Zosyn received on way to OR
Followed protocol
Followed protocol
Followed protocol/Zosyn

## 2020-08-04 NOTE — BRIEF OPERATIVE NOTE - NSICDXBRIEFOPLAUNCH_GEN_ALL_CORE
<--- Click to Launch ICDx for PreOp, PostOp and Procedure

## 2020-08-04 NOTE — PROCEDURE NOTE - ADDITIONAL PROCEDURE DETAILS
cvl x 2 placed for port access  hd cath placed under usn guidance
Lumbar drain placement    L3-L4 intervertebral space identified by palpation and Lumbar drain inserted, clear CSF flow confirmed. Aseptic Dressing placed to secure the drain.

## 2020-08-04 NOTE — BRIEF OPERATIVE NOTE - COMMENTS
I was the first assistant for this case including but not limited to venous access, cannula placement and cannula securing.    I was present for this procedure and participated as first assistant as described by the PA above, unless otherwise noted below.
Dr. Flynn was the first assistant for this case including but not limited to thoracabdominal and repair descending aorta replacement.      I was present for this procedure and participated as first assistant as described by the PA above, unless otherwise noted below.

## 2020-08-05 NOTE — PROGRESS NOTE ADULT - SUBJECTIVE AND OBJECTIVE BOX
Patient is a 70y Male seen and evaluated at bedside. Poor prognosis. Further deteriorated overnight. Intermittently on CVVHD as tolerated. Now on VV ECMO for hypoxic respiratory failure.       Meds:    albumin human 25% IVPB 50 every 10 minutes  angiotensin II Infusion 20 <Continuous>  chlorhexidine 0.12% Liquid 5 every 4 hours  chlorhexidine 2% Cloths 1 <User Schedule>  chlorhexidine 4% Liquid 1 <User Schedule>  cisatracurium Infusion 3 <Continuous>  CRRT Treatment  <Continuous>  dextrose 40% Gel 15 once PRN  dextrose 5%. 1000 <Continuous>  dextrose 50% Injectable 12.5 once  dextrose 50% Injectable 25 once  dextrose 50% Injectable 25 once  EPINEPHrine    Infusion 0.05 <Continuous>  fentaNYL   Infusion.... 0.5 <Continuous>  fluconAZOLE IVPB 400 every 24 hours  fluconAZOLE IVPB    hydrocortisone sodium succinate Injectable 100 every 8 hours  insulin lispro (HumaLOG) corrective regimen sliding scale  every 6 hours  insulin regular Infusion 4 <Continuous>  lactated ringers. 1000 <Continuous>  metroNIDAZOLE  IVPB 500 every 8 hours  norepinephrine Infusion 0.05 <Continuous>  pantoprazole  Injectable 40 daily  phenylephrine    Infusion 0.3 <Continuous>  piperacillin/tazobactam IVPB.. 4.5 every 6 hours  propofol Infusion 32.468 <Continuous>  PureFlow Dialysate RFP-401 (K 4 / Ca 3) 5000 <Continuous>  sodium chloride 0.9%. 1000 <Continuous>  vancomycin  IVPB 1250 every 24 hours  vasopressin Infusion 0.05 <Continuous>      T(C): , Max: 35.5 (08-05-20 @ 09:00)  T(F): , Max: 95.9 (08-05-20 @ 09:00)  HR: 73 (08-05-20 @ 11:00)  BP: --  BP(mean): --  RR: 20 (08-05-20 @ 11:00)  SpO2: 99% (08-04-20 @ 22:00)  Wt(kg): --    08-04 @ 07:01  -  08-05 @ 07:00  --------------------------------------------------------  IN: 8973.5 mL / OUT: 7135 mL / NET: 1838.5 mL    08-05 @ 07:01  -  08-05 @ 11:43  --------------------------------------------------------  IN: 586.2 mL / OUT: 531 mL / NET: 55.2 mL          Review of Systems: Unable to obtain    PHYSICAL EXAM:  GENERAL: Intubated, sedated, +ECMO   CHEST/LUNG: Bilateral diffuse rhonchi   HEART: Regular rate and rhythm, no murmur  ABDOMEN: +abdominal drain, tense   EXTREMITIES: 1+ edema b/l       LABS:                        9.2    3.66  )-----------( 65       ( 05 Aug 2020 05:13 )             27.1     08-05    148<H>  |  108  |  18  ----------------------------<  114<H>  4.5   |  17<L>  |  1.46<H>    Ca    9.1      05 Aug 2020 05:13  Phos  6.6     08-05  Mg     2.2     08-05    TPro  3.6<L>  /  Alb  2.1<L>  /  TBili  2.6<H>  /  DBili  x   /  AST  1147<H>  /  ALT  213<H>  /  AlkPhos  68  08-05      PT/INR - ( 05 Aug 2020 05:13 )   PT: 18.3 sec;   INR: 1.56          PTT - ( 05 Aug 2020 05:13 )  PTT:34.9 sec          RADIOLOGY & ADDITIONAL STUDIES: Patient is a 70y Male seen and evaluated at bedside. Poor prognosis. Further deteriorated overnight. Intermittently on CVVHD as tolerated. Now on VV ECMO for hypoxic respiratory failure.       Meds:    albumin human 25% IVPB 50 every 10 minutes  angiotensin II Infusion 20 <Continuous>  chlorhexidine 0.12% Liquid 5 every 4 hours  chlorhexidine 2% Cloths 1 <User Schedule>  chlorhexidine 4% Liquid 1 <User Schedule>  cisatracurium Infusion 3 <Continuous>  CRRT Treatment  <Continuous>  dextrose 40% Gel 15 once PRN  dextrose 5%. 1000 <Continuous>  dextrose 50% Injectable 12.5 once  dextrose 50% Injectable 25 once  dextrose 50% Injectable 25 once  EPINEPHrine    Infusion 0.05 <Continuous>  fentaNYL   Infusion.... 0.5 <Continuous>  fluconAZOLE IVPB 400 every 24 hours  fluconAZOLE IVPB    hydrocortisone sodium succinate Injectable 100 every 8 hours  insulin lispro (HumaLOG) corrective regimen sliding scale  every 6 hours  insulin regular Infusion 4 <Continuous>  lactated ringers. 1000 <Continuous>  metroNIDAZOLE  IVPB 500 every 8 hours  norepinephrine Infusion 0.05 <Continuous>  pantoprazole  Injectable 40 daily  phenylephrine    Infusion 0.3 <Continuous>  piperacillin/tazobactam IVPB.. 4.5 every 6 hours  propofol Infusion 32.468 <Continuous>  PureFlow Dialysate RFP-401 (K 4 / Ca 3) 5000 <Continuous>  sodium chloride 0.9%. 1000 <Continuous>  vancomycin  IVPB 1250 every 24 hours  vasopressin Infusion 0.05 <Continuous>      T(C): , Max: 35.5 (08-05-20 @ 09:00)  T(F): , Max: 95.9 (08-05-20 @ 09:00)  HR: 73 (08-05-20 @ 11:00)  BP: --78/40  BP(mean): --  RR: 20 (08-05-20 @ 11:00)  SpO2: 99% (08-04-20 @ 22:00)  Wt(kg): --    08-04 @ 07:01  -  08-05 @ 07:00  --------------------------------------------------------  IN: 8973.5 mL / OUT: 7135 mL / NET: 1838.5 mL    08-05 @ 07:01  -  08-05 @ 11:43  --------------------------------------------------------  IN: 586.2 mL / OUT: 531 mL / NET: 55.2 mL          Review of Systems: Unable to obtain    PHYSICAL EXAM:  GENERAL: Intubated, sedated, +ECMO   CHEST/LUNG: Bilateral diffuse rhonchi   HEART: Regular rate and rhythm, no murmur  ABDOMEN: +abdominal drain, tense   EXTREMITIES: 1+ edema b/l       LABS:                        9.2    3.66  )-----------( 65       ( 05 Aug 2020 05:13 )             27.1     08-05    148<H>  |  108  |  18  ----------------------------<  114<H>  4.5   |  17<L>  |  1.46<H>    Ca    9.1      05 Aug 2020 05:13  Phos  6.6     08-05  Mg     2.2     08-05    TPro  3.6<L>  /  Alb  2.1<L>  /  TBili  2.6<H>  /  DBili  x   /  AST  1147<H>  /  ALT  213<H>  /  AlkPhos  68  08-05      PT/INR - ( 05 Aug 2020 05:13 )   PT: 18.3 sec;   INR: 1.56          PTT - ( 05 Aug 2020 05:13 )  PTT:34.9 sec          RADIOLOGY & ADDITIONAL STUDIES:

## 2020-08-05 NOTE — PROGRESS NOTE ADULT - SUBJECTIVE AND OBJECTIVE BOX
Worsening ARDS picture despite ECMO.  Temporary abdominal closure is intact.  As d/w Dr. Willson, will defer second-look operation given poor prognosis.

## 2020-08-05 NOTE — DIETITIAN INITIAL EVALUATION ADULT. - ADD RECOMMEND
1. Align nutrition with GOC at all times, please consult for TF recommendations as clinically indicated/ appropriate 2. Manage pain prn 3. Trend wts 4. Monitor and replete lytes

## 2020-08-05 NOTE — PROGRESS NOTE ADULT - REASON FOR ADMISSION
Thoracoabdominal aneurysm

## 2020-08-05 NOTE — PROGRESS NOTE ADULT - ASSESSMENT
71yo Male with HTN, CA prostate S/P  radiation, aortic dissection s/p endovascular repair (2011 by Dr. Madi Lewis) s/p AAA repair. Post OP course complicated by anuria, shock liver and high anion metabolic acidosis likely 2/2 to lactic acidosis requiring CVVHD. Being taken to the OR for abdominal compartment syndrome today.    He received platelets, plasma, PRBCs and cryo overnight, His pressor requirements began increasing as he was started on dobutamine, epinephrine, milrenone, NE and vasopressin  He received 3.1L of fluids. He voided 100cc in the last 12 hours    Anuric with metabolic acidosis presenting with concern for abd compartment sx  BP on multiple pressors  Electrolytes maintained wnl   Hgb at goal  Ca wnl    We will continue to follow this patient. Likely to resume CVVHD post op.   Please obtain Ur Na, Ur Urea, Ur Creat and renal bladder sono. Continue burleson with strict I&Os.
71yo Male with HTN, CA prostate S/P  radiation, aortic dissection s/p endovascular repair (2011 by Dr. Madi Lewis) s/p AAA repair. Post OP course complicated by anuria, shock liver and high anion metabolic acidosis likely 2/2 to lactic acidosis requiring CVVHD.     #oliguric acute renal failure likely secondary to ischemic ATN  #metabolic acidosis   #multiorgan failure   complicated hospital course w/multiple surgeries and transfusions, acceptable BP on 4-5 pressors   net positive ~2L/24h   on multiple pressors  lactic acidosis   Bicarb 17, otherwise electrolytes maintained wnl   Hgb acceptable   continue with CVVHD as tolerated for acidosis   poor prognosis   recommend GOC discussion with family
Patient is a 71 y/o M with HTN, prostate Ca s/p radiation, aortic dissection s/p endovascular repair in 2011 who presented with a new expanding descending aortic aneurysm from 7.3cm to 8.7cm at the level of the diaphragm, 6.3cm to 8.7cm at the level of the SMA and from 539cm to 7.0cm at the level of the renal arteries. He underwent open repair with Dr. Willson and Dr. Flynn on 8/3 and was transferred to CTICU post-operatively. On POD#1 he remained hypotensive despite being on pressors and was taken back to the OR. Ischemic bowel was found and he had bowel resection, left in discontinuity, with open abdomen (Abthera in place).    - Return to OR tomorrow per Surgery team  - Continue hemodynamic and ventilator support  - Continue CRRT  - Rest of care per ICU team
Patient is a 71 y/o M with HTN, prostate Ca s/p radiation, aortic dissection s/p endovascular repair in 2011 who presented with a new expanding descending aortic aneurysm from 7.3cm to 8.7cm at the level of the diaphragm, 6.3cm to 8.7cm at the level of the SMA and from 539cm to 7.0cm at the level of the renal arteries. He underwent open repair with Dr. Willson and Dr. Flynn on 8/3 and was transferred to CTICU post-operatively. On POD#1 he remained hypotensive despite being on pressors and was taken back to the OR. Ischemic bowel was found and he had bowel resection, left in discontinuity, with open abdomen (Abthera in place). Patient continued to deteriorate and was placed on ECMO overnight. His prognosis is very poor      - Rest of care per ICU and primary team  - Pending goals of care discussion with family
71yo M former smoker with PMHx of HTN, prostate Ca s/p radiation, aortic dissection s/p endovascular repair (2011 by Dr. Madi Lewis) who recently presented to [facility] with 8/10 cramping, non-radiating abdominal pain. CT scan done at that time demonstrated expanding descending aortic aneurysm from 7.3cm to 8.7cm at the level of the diaphragm, 6.3cm to 8.7cm at the level of the SMA and from 539cm to 7.0cm at the level of the renal arteries. Additional a new opacifies false lumen is noted anteriorly within aneurysm. Pt was encouraged to stay at [facility] for further treatment but left AMA to seek second opinion. He presented to Dr. Willson's office7/31 for evaluation and was referred to the ED for admission, blood pressure control and pre-surgical work up.  Pt admitted to CT surgery 9 east for strict BP control and pre-surgical work up.     Abdominal Aortic Aneurysm  - Plan for Reop, open Thoracoabdominal aneurysm repair   - Preop orders in , NPO at [facility]  - Type and screen sent, blood on hold for OR  - consent signed

## 2020-08-05 NOTE — PROGRESS NOTE ADULT - SUBJECTIVE AND OBJECTIVE BOX
STATUS POST:  Thoracoabdominal repair of descending aorta replacement     SUBJECTIVE: Patient continued to be hypoxic due to ARDS and was placed on VV ECMO overnight. He remains hemodynamically unstable, on pressors, and on CRRT as UOP was low. He had a bronchoscopy at night and cultures were sent. His prognosis is poor.    angiotensin II Infusion 20 NANOGram(s)/kG/Min IV Continuous <Continuous>  EPINEPHrine    Infusion 0.05 MICROgram(s)/kG/Min IV Continuous <Continuous>  fluconAZOLE IVPB 400 milliGRAM(s) IV Intermittent every 24 hours  fluconAZOLE IVPB      metroNIDAZOLE  IVPB 500 milliGRAM(s) IV Intermittent every 8 hours  norepinephrine Infusion 0.05 MICROgram(s)/kG/Min IV Continuous <Continuous>  phenylephrine    Infusion 0.3 MICROgram(s)/kG/Min IV Continuous <Continuous>  piperacillin/tazobactam IVPB.. 4.5 Gram(s) IV Intermittent every 6 hours  vancomycin  IVPB 1250 milliGRAM(s) IV Intermittent every 24 hours      Vital Signs Last 24 Hrs  T(C): 35.5 (05 Aug 2020 09:00), Max: 35.5 (05 Aug 2020 09:00)  T(F): 95.9 (05 Aug 2020 09:00), Max: 95.9 (05 Aug 2020 09:00)  HR: 112 (05 Aug 2020 10:00) (59 - 141)  BP: --  BP(mean): --  RR: 20 (05 Aug 2020 10:00) (14 - 24)  SpO2: 99% (04 Aug 2020 22:00) (79% - 99%)  I&O's Detail    04 Aug 2020 07:01  -  05 Aug 2020 07:00  --------------------------------------------------------  IN:    Albumin 5%  - 250 mL: 1500 mL    angiotensin II Infusion: 157.2 mL    cisatracurium Infusion: 9.2 mL    EPINEPHrine Infusion: 228.8 mL    fentaNYL   Infusion..: 0.2 mL    IV PiggyBack: 900 mL    norepinephrine Infusion: 180.2 mL    Packed Red Blood Cells: 2050 mL    phenylephrine   Infusion: 203.6 mL    Plasma: 1648 mL    Platelets - Single Donor: 780 mL    propofol Infusion: 2.3 mL    sodium chloride 0.9%.: 1200 mL    vasopressin Infusion: 114 mL  Total IN: 8973.5 mL    OUT:    Bulb: 295 mL    Chest Tube: 570 mL    Chest Tube: 555 mL    Chest Tube: 310 mL    Drain: 220 mL    Indwelling Catheter - Urethral: 310 mL    Nasoenteral Tube: 825 mL    Other: 1330 mL    VAC (Vacuum Assisted Closure) System: 2720 mL  Total OUT: 7135 mL    Total NET: 1838.5 mL      05 Aug 2020 07:01  -  05 Aug 2020 11:00  --------------------------------------------------------  IN:    EPINEPHrine Infusion: 43.5 mL    IV PiggyBack: 300 mL    norepinephrine Infusion: 26.7 mL    sodium chloride 0.9%.: 200 mL    vasopressin Infusion: 16 mL  Total IN: 586.2 mL    OUT:    Bulb: 150 mL    Chest Tube: 10 mL    Chest Tube: 30 mL    Chest Tube: 10 mL    Drain: 20 mL    Indwelling Catheter - Urethral: 11 mL    Other: 25 mL    VAC (Vacuum Assisted Closure) System: 200 mL  Total OUT: 456 mL    Total NET: 130.2 mL      Physical Exam:  General: Patient sedated, paralyzed, in acute distress  C/V: on pressors, and VV ECMO  Pulm: On mechanical ventilation (AC: 20/400/8/60%)  Abd: soft, left open, Abthera in place  Extrem: warm, dopplerable PT, DP bilaterally.      LABS:                        9.2    3.66  )-----------( 65       ( 05 Aug 2020 05:13 )             27.1     08-05    148<H>  |  108  |  18  ----------------------------<  114<H>  4.5   |  17<L>  |  1.46<H>    Ca    9.1      05 Aug 2020 05:13  Phos  6.6     08-05  Mg     2.2     08-05    TPro  3.6<L>  /  Alb  2.1<L>  /  TBili  2.6<H>  /  DBili  x   /  AST  1147<H>  /  ALT  213<H>  /  AlkPhos  68  08-05    PT/INR - ( 05 Aug 2020 05:13 )   PT: 18.3 sec;   INR: 1.56          PTT - ( 05 Aug 2020 05:13 )  PTT:34.9 sec

## 2020-08-05 NOTE — DISCHARGE NOTE FOR THE EXPIRED PATIENT - HOSPITAL COURSE
69 y/o male with PMHx HTN, prostate CA s/p radiation, aortic dissection s/p TEVAR 2011, presented with abdominal pain.  CT scan done revealing an expanding descending aortic aneurysm.  He was seen by Dr. Willson as an out-patient and deemed a good surgical candidate.  He was admitted to CTICU for BP control and surgical work-up.  Beta blockers and IV BP medication was started.  On 8/3/2020 he underwent TAA repair.  He came out of the OR in cardiogenic shock, renal failure, and abdominal compartment syndrome.  He was taken back to the OR for exploration and underwent small bowel resection and partial colectomy.  He returned from the OR in critical condition, open abdomen, worsening hypoxia and cardiogenic shock requiring multiple pressors, inotropes and blood/blood products.  On 8/4/20, V-V ecmo placed at the bedside.  In spite of his improved oxygenation, he remained critically ill in MSOF and had worsening metabolic acidosis due to rising lactic acid.  Ultimately the family was notified of his poor prognosis and agreed to not pursue further medical treatment and had verbally agreed to DNR over the phone.  At 1:33 he became asystolic and did not have breath sounds or pulse.  He was pronounced dead by the CTICU intensivist.

## 2020-08-05 NOTE — DIETITIAN INITIAL EVALUATION ADULT. - OTHER INFO
70M with HTN, prostate Ca s/p radiation, aortic dissection s/p endovascular repair in 2011 who presented with a new expanding descending aortic aneurysm from 7.3cm to 8.7cm at the level of the diaphragm, 6.3cm to 8.7cm at the level of the SMA and from 539cm to 7.0cm at the level of the renal arteries. He underwent open repair with Dr. Willson and Dr. Flynn on 8/3 and was transferred to CTICU post-operatively s/p OR initiated on CVVHD as pt did not respond to lasix and was anuric after receiving 7L. On POD#1 he remained hypotensive despite being on pressors and was taken back to the OR. Ischemic bowel was found and he had bowel resection, left in discontinuity, with open abdomen (Abthera in place). Patient continued to deteriorate w hypoxic respiratory failure due to ARDS and was placed on ECMO overnight. His prognosis is noted to be very poor. Continues to be hemodynamically unstable on CRRT as UOP low, underwent bronch last night. Now pending GOC conversation with family. Pt remains on multiple pressors to maintain BP, MAP currently 48, paralyzed on Nimbex, sedated on Fentanyl, also ordered for propofol however currently not running. Per flow sheet vac noted to abdomen, abdomen with fullness, no d/c/v, unable to assess pain. If aligns with GOC and is clinically indicated please consult for TF recommendations to meet pt needs. Will follow per protocol. 70M with HTN, prostate Ca s/p radiation, aortic dissection s/p endovascular repair in 2011 who presented with a new expanding descending aortic aneurysm from 7.3cm to 8.7cm at the level of the diaphragm, 6.3cm to 8.7cm at the level of the SMA and from 539cm to 7.0cm at the level of the renal arteries. He underwent open repair with Dr. Willson and Dr. Flynn on 8/3 and was transferred to CTICU post-operatively s/p OR initiated on CVVHD as pt did not respond to lasix and was anuric after receiving 7L. On POD#1 he remained hypotensive despite being on pressors and was taken back to the OR. Ischemic bowel was found and he had bowel resection, left in discontinuity, with open abdomen (Abthera in place). Patient continued to deteriorate w hypoxic respiratory failure due to ARDS and was placed on ECMO overnight. His prognosis is noted to be very poor. Continues to be hemodynamically unstable on CRRT as UOP low, underwent bronch last night. HD cath clogged multiple times o/n. Now pending GOC conversation with family. Pt remains on multiple pressors to maintain BP, MAP currently 48, paralyzed on Nimbex, sedation off, neuro not intact. Per flow sheet wound vac noted to abdomen, abdomen with fullness, no d/c/v, unable to assess pain. If aligns with GOC and is clinically indicated please consult for TF recommendations to meet pt needs. Will follow per protocol.

## 2020-08-05 NOTE — PROGRESS NOTE ADULT - ATTENDING COMMENTS
seen on CVVHD , agree with above   tolerating rx, VSS on pressors   cont rx as above-- gentle UF only if tolerates--rx  primarily for clearance
seen post op- remains oligoanuric with lactic acidosis- on 4 pressors  will resume CVVHD primarily for clearance as tolerates  d/w CTICU team
on CVVHD for clearance primarily as tolerates  prognosis very poor

## 2020-08-05 NOTE — DIETITIAN INITIAL EVALUATION ADULT. - ENERGY NEEDS
IBW used as pt >100% IBW per critical care guidelines   ABW 77kg, IBW 75kg, 102% IBW, ht 70", BMI 24.4   Nutrient needs based on [facility] standards of care for maintenance in adults, adjusted for CRRT, post-op demand, hypermetabolic state, fluid per team IBW used as pt >100% IBW per critical care guidelines   ABW 77kg, IBW 75kg, 102% IBW, ht 70", BMI 24.4   Nutrient needs based on [facility] standards of care for maintenance in adults, adjusted for CRRT, post-op demand, hypermetabolic state, infection  Fluid per team

## 2020-08-05 NOTE — PROGRESS NOTE ADULT - PROVIDER SPECIALTY LIST ADULT
CT Surgery
Colorectal Surgery
Critical Care
Nephrology
Nephrology
Neurosurgery
Surgery
Vascular Surgery
Critical Care
Nephrology

## 2020-08-05 NOTE — PROGRESS NOTE ADULT - SUBJECTIVE AND OBJECTIVE BOX
CTICU  CRITICAL  CARE  attending     Hand off received 					   Pertinent clinical, laboratory, radiographic, hemodynamic, echocardiographic, respiratory data, microbiologic data and chart were reviewed and analyzed frequently throughout the course of the day and night  Patient seen and examined with CTS/ SH attending at bedside  Pt is a 70y , Male, HEALTH ISSUES - PROBLEM Dx:      , FAMILY HISTORY:  PAST MEDICAL & SURGICAL HISTORY:  Prostate cancer  History of aortic dissection  Hypertension  Essential hypertension  Hypertension  Status post endoscopic repair of thoracic aortic aneurysm (TAA)    Patient is a 70y old  Male who presents with a chief complaint of Thoracoabdominal aneurysm (05 Aug 2020 14:58)      14 system review was unremarkable    Vital signs, hemodynamic and respiratory parameters were reviewed from the bedside nursing flowsheet.  ICU Vital Signs Last 24 Hrs  T(C): 35.5 (05 Aug 2020 09:00), Max: 35.5 (05 Aug 2020 09:00)  T(F): 95.9 (05 Aug 2020 09:00), Max: 95.9 (05 Aug 2020 09:00)  HR: 0 (05 Aug 2020 13:33) (0 - 141)  BP: --  BP(mean): --  ABP: 0/0 (05 Aug 2020 13:33) (0/0 - 179/66)  ABP(mean): 0 (05 Aug 2020 13:33) (0 - 123)  RR: 20 (05 Aug 2020 13:00) (14 - 20)  SpO2: 69% (05 Aug 2020 13:33) (67% - 99%)    Adult Advanced Hemodynamics Last 24 Hrs  CVP(mm Hg): --  CVP(cm H2O): --  CO: --  CI: --  PA: 8/8 (04 Aug 2020 21:00) (8/8 - 45/30)  PA(mean): 8 (04 Aug 2020 21:00) (8 - 39)  PCWP: --  SVR: --  SVRI: --  PVR: --  PVRI: --, ABG - ( 05 Aug 2020 08:32 )  pH, Arterial: 7.42  pH, Blood: x     /  pCO2: 23    /  pO2: 129   / HCO3: 15    / Base Excess: -8.5  /  SaO2: 98                Mode: AC/ CMV (Assist Control/ Continuous Mandatory Ventilation)  RR (machine): 20  TV (machine): 400  FiO2: 60  PEEP: 8  ITime: 1.7  MAP: 28  PIP: 45    Intake and output was reviewed and the fluid balance was calculated  Daily     Daily Weight in k (05 Aug 2020 13:33)  I&O's Summary    04 Aug 2020 07:01  -  05 Aug 2020 07:00  --------------------------------------------------------  IN: 8973.5 mL / OUT: 7135 mL / NET: 1838.5 mL    05 Aug 2020 07:  -  05 Aug 2020 15:03  --------------------------------------------------------  IN: 725.4 mL / OUT: 706 mL / NET: 19.4 mL        All lines and drain sites were assessed  [facility]      POCT Blood Glucose.: 125 mg/dL (05 Aug 2020 13:07)    No acute change in mental status  Auscultation of the chest reveals equal bs  Abdomen is soft  Extremities are warm and well perfused  Wounds appear clean and unremarkable  Antibiotics are periop    labs  CBC Full  -  ( 05 Aug 2020 05:13 )  WBC Count : 3.66 K/uL  RBC Count : 3.17 M/uL  Hemoglobin : 9.2 g/dL  Hematocrit : 27.1 %  Platelet Count - Automated : 65 K/uL  Mean Cell Volume : 85.5 fl  Mean Cell Hemoglobin : 29.0 pg  Mean Cell Hemoglobin Concentration : 33.9 gm/dL  Auto Neutrophil # : x  Auto Lymphocyte # : x  Auto Monocyte # : x  Auto Eosinophil # : x  Auto Basophil # : x  Auto Neutrophil % : x  Auto Lymphocyte % : x  Auto Monocyte % : x  Auto Eosinophil % : x  Auto Basophil % : x    08-    148<H>  |  108  |  18  ----------------------------<  114<H>  4.5   |  17<L>  |  1.46<H>    Ca    9.1      05 Aug 2020 05:13  Phos  6.6     08-05  Mg     2.2     08-05    TPro  3.6<L>  /  Alb  2.1<L>  /  TBili  2.6<H>  /  DBili  x   /  AST  1147<H>  /  ALT  213<H>  /  AlkPhos  68  08-05    PT/INR - ( 05 Aug 2020 05:13 )   PT: 18.3 sec;   INR: 1.56          PTT - ( 05 Aug 2020 05:13 )  PTT:34.9 sec  The current medications were reviewed   MEDICATIONS  (STANDING):  albumin human 25% IVPB 50 milliLiter(s) IV Intermittent every 10 minutes  angiotensin II Infusion 20 NANOGram(s)/kG/Min (9.24 mL/Hr) IV Continuous <Continuous>  chlorhexidine 0.12% Liquid 5 milliLiter(s) Oral Mucosa every 4 hours  chlorhexidine 2% Cloths 1 Application(s) Topical <User Schedule>  chlorhexidine 4% Liquid 1 Application(s) Topical <User Schedule>  cisatracurium Infusion 3 MICROgram(s)/kG/Min (13.9 mL/Hr) IV Continuous <Continuous>  CRRT Treatment    <Continuous>  dextrose 5%. 1000 milliLiter(s) (50 mL/Hr) IV Continuous <Continuous>  dextrose 50% Injectable 12.5 Gram(s) IV Push once  dextrose 50% Injectable 25 Gram(s) IV Push once  dextrose 50% Injectable 25 Gram(s) IV Push once  EPINEPHrine    Infusion 0.05 MICROgram(s)/kG/Min (7.22 mL/Hr) IV Continuous <Continuous>  fentaNYL   Infusion.... 0.5 MICROgram(s)/kG/Hr (0.77 mL/Hr) IV Continuous <Continuous>  fluconAZOLE IVPB 400 milliGRAM(s) IV Intermittent every 24 hours  fluconAZOLE IVPB      hydrocortisone sodium succinate Injectable 100 milliGRAM(s) IV Push every 8 hours  insulin lispro (HumaLOG) corrective regimen sliding scale   SubCutaneous every 6 hours  insulin regular Infusion 4 Unit(s)/Hr (4 mL/Hr) IV Continuous <Continuous>  lactated ringers. 1000 milliLiter(s) (75 mL/Hr) IV Continuous <Continuous>  metroNIDAZOLE  IVPB 500 milliGRAM(s) IV Intermittent every 8 hours  norepinephrine Infusion 0.05 MICROgram(s)/kG/Min (3.61 mL/Hr) IV Continuous <Continuous>  pantoprazole  Injectable 40 milliGRAM(s) IV Push daily  phenylephrine    Infusion 0.3 MICROgram(s)/kG/Min (4.33 mL/Hr) IV Continuous <Continuous>  piperacillin/tazobactam IVPB.. 4.5 Gram(s) IV Intermittent every 6 hours  propofol Infusion 32.468 MICROgram(s)/kG/Min (15 mL/Hr) IV Continuous <Continuous>  PureFlow Dialysate RFP-401 (K 4 / Ca 3) 5000 milliLiter(s) (2500 mL/Hr) CRRT <Continuous>  sodium chloride 0.9%. 1000 milliLiter(s) (10 mL/Hr) IV Continuous <Continuous>  vancomycin  IVPB 1250 milliGRAM(s) IV Intermittent every 24 hours  vasopressin Infusion 0.05 Unit(s)/Min (3 mL/Hr) IV Continuous <Continuous>    MEDICATIONS  (PRN):  dextrose 40% Gel 15 Gram(s) Oral once PRN Blood Glucose LESS THAN 70 milliGRAM(s)/deciliter       PROBLEM LIST/ ASSESSMENT:  HEALTH ISSUES - PROBLEM Dx:      ,   Patient is a 70y old  Male who presents with a chief complaint of Thoracoabdominal aneurysm (05 Aug 2020 14:58)      on VV ECMO    ECMO daily mgmt included monitoring flows currently at 2.9 lpm, exit and entry cannulae sites, distal pulses, adjusting ecmo fio2 and sweep gases, monitoring for circuit thrombosis, montioring for pressure gradients , ecmo checklist carried out, antocoagulation protocols, not on heparin due to is drain and coagulopy    will discuss goals of care with family            My plan includes :  close hemodynamic, ventilatory and drain monitoring and management per post op routine    Monitor for arrhythmias and monitor parameters for organ perfusion  beta blockade not administered due to hemodynamic instability and bradycardia  monitor neurologic status  Head of the bed should remain elevated to 45 deg .   chest PT and IS will be encouraged  monitor adequacy of oxygenation and ventilation and attempt to wean oxygen  antibiotic regimen will be tailored to the clinical, laboratory and microbiologic data  Nutritional goals will be met using po eventually , ensure adequate caloric intake and montior the same  Stress ulcer and VTE prophylaxis will be achieved    Glycemic control is satisfactory  Electrolytes have been repleted as necessary and wound care has been carried out. Pain control has been achieved.   agressive physical therapy and early mobility and ambulation goals will be met   The family was updated about the course and plan  CRITICAL CARE TIME personally provided by me  in evaluation and management, reassessments, review and interpretation of labs and x-rays, ventilator and hemodynamic management, formulating a plan and coordinating care: ___90____ MIN.  Time does not include procedural time.  CTICU ATTENDING     					    Alok Bledsoe MD

## 2020-08-05 NOTE — PROGRESS NOTE ADULT - SUBJECTIVE AND OBJECTIVE BOX
CTICU  CRITICAL  CARE  attending     Hand off received 					   Pertinent clinical, laboratory, radiographic, hemodynamic, echocardiographic, respiratory data, microbiologic data and chart were reviewed and analyzed frequently throughout the course of the day and night  Patient seen and examined with CTS/ SH attending at bedside  Pt is a 70y , Male, HEALTH ISSUES - PROBLEM Dx:      , FAMILY HISTORY:  PAST MEDICAL & SURGICAL HISTORY:  Prostate cancer  History of aortic dissection  Hypertension  Essential hypertension  Hypertension  Status post endoscopic repair of thoracic aortic aneurysm (TAA)    Patient is a 70y old  Male who presents with a chief complaint of Thoracoabdominal aneurysm (05 Aug 2020 13:49)      14 system review was unremarkable    Vital signs, hemodynamic and respiratory parameters were reviewed from the bedside nursing flowsheet.  ICU Vital Signs Last 24 Hrs  T(C): 35.5 (05 Aug 2020 09:00), Max: 35.5 (05 Aug 2020 09:00)  T(F): 95.9 (05 Aug 2020 09:00), Max: 95.9 (05 Aug 2020 09:00)  HR: 0 (05 Aug 2020 13:33) (0 - 141)  BP: --  BP(mean): --  ABP: 0/0 (05 Aug 2020 13:33) (0/0 - 179/66)  ABP(mean): 0 (05 Aug 2020 13:33) (0 - 123)  RR: 20 (05 Aug 2020 13:00) (14 - 22)  SpO2: 69% (05 Aug 2020 13:33) (67% - 99%)    Adult Advanced Hemodynamics Last 24 Hrs  CVP(mm Hg): --  CVP(cm H2O): --  CO: --  CI: --  PA: 8/8 (04 Aug 2020 21:00) (8/8 - 45/30)  PA(mean): 8 (04 Aug 2020 21:00) (8 - 39)  PCWP: --  SVR: --  SVRI: --  PVR: --  PVRI: --, ABG - ( 05 Aug 2020 08:32 )  pH, Arterial: 7.42  pH, Blood: x     /  pCO2: 23    /  pO2: 129   / HCO3: 15    / Base Excess: -8.5  /  SaO2: 98                Mode: AC/ CMV (Assist Control/ Continuous Mandatory Ventilation)  RR (machine): 20  TV (machine): 400  FiO2: 60  PEEP: 8  ITime: 1.7  MAP: 28  PIP: 45    Intake and output was reviewed and the fluid balance was calculated  Daily     Daily Weight in k (05 Aug 2020 13:33)  I&O's Summary    04 Aug 2020 07:01  -  05 Aug 2020 07:00  --------------------------------------------------------  IN: 8973.5 mL / OUT: 7135 mL / NET: 1838.5 mL    05 Aug 2020 07:  -  05 Aug 2020 14:58  --------------------------------------------------------  IN: 725.4 mL / OUT: 706 mL / NET: 19.4 mL        All lines and drain sites were assessed  [facility]      POCT Blood Glucose.: 125 mg/dL (05 Aug 2020 13:07)    No acute change in mental status  Auscultation of the chest reveals equal bs  Abdomen is soft  Extremities are warm and well perfused  Wounds appear clean and unremarkable  Antibiotics are periop    labs  CBC Full  -  ( 05 Aug 2020 05:13 )  WBC Count : 3.66 K/uL  RBC Count : 3.17 M/uL  Hemoglobin : 9.2 g/dL  Hematocrit : 27.1 %  Platelet Count - Automated : 65 K/uL  Mean Cell Volume : 85.5 fl  Mean Cell Hemoglobin : 29.0 pg  Mean Cell Hemoglobin Concentration : 33.9 gm/dL  Auto Neutrophil # : x  Auto Lymphocyte # : x  Auto Monocyte # : x  Auto Eosinophil # : x  Auto Basophil # : x  Auto Neutrophil % : x  Auto Lymphocyte % : x  Auto Monocyte % : x  Auto Eosinophil % : x  Auto Basophil % : x    08-    148<H>  |  108  |  18  ----------------------------<  114<H>  4.5   |  17<L>  |  1.46<H>    Ca    9.1      05 Aug 2020 05:13  Phos  6.6     08-05  Mg     2.2     08-05    TPro  3.6<L>  /  Alb  2.1<L>  /  TBili  2.6<H>  /  DBili  x   /  AST  1147<H>  /  ALT  213<H>  /  AlkPhos  68  08-05    PT/INR - ( 05 Aug 2020 05:13 )   PT: 18.3 sec;   INR: 1.56          PTT - ( 05 Aug 2020 05:13 )  PTT:34.9 sec  The current medications were reviewed   MEDICATIONS  (STANDING):  albumin human 25% IVPB 50 milliLiter(s) IV Intermittent every 10 minutes  angiotensin II Infusion 20 NANOGram(s)/kG/Min (9.24 mL/Hr) IV Continuous <Continuous>  chlorhexidine 0.12% Liquid 5 milliLiter(s) Oral Mucosa every 4 hours  chlorhexidine 2% Cloths 1 Application(s) Topical <User Schedule>  chlorhexidine 4% Liquid 1 Application(s) Topical <User Schedule>  cisatracurium Infusion 3 MICROgram(s)/kG/Min (13.9 mL/Hr) IV Continuous <Continuous>  CRRT Treatment    <Continuous>  dextrose 5%. 1000 milliLiter(s) (50 mL/Hr) IV Continuous <Continuous>  dextrose 50% Injectable 12.5 Gram(s) IV Push once  dextrose 50% Injectable 25 Gram(s) IV Push once  dextrose 50% Injectable 25 Gram(s) IV Push once  EPINEPHrine    Infusion 0.05 MICROgram(s)/kG/Min (7.22 mL/Hr) IV Continuous <Continuous>  fentaNYL   Infusion.... 0.5 MICROgram(s)/kG/Hr (0.77 mL/Hr) IV Continuous <Continuous>  fluconAZOLE IVPB 400 milliGRAM(s) IV Intermittent every 24 hours  fluconAZOLE IVPB      hydrocortisone sodium succinate Injectable 100 milliGRAM(s) IV Push every 8 hours  insulin lispro (HumaLOG) corrective regimen sliding scale   SubCutaneous every 6 hours  insulin regular Infusion 4 Unit(s)/Hr (4 mL/Hr) IV Continuous <Continuous>  lactated ringers. 1000 milliLiter(s) (75 mL/Hr) IV Continuous <Continuous>  metroNIDAZOLE  IVPB 500 milliGRAM(s) IV Intermittent every 8 hours  norepinephrine Infusion 0.05 MICROgram(s)/kG/Min (3.61 mL/Hr) IV Continuous <Continuous>  pantoprazole  Injectable 40 milliGRAM(s) IV Push daily  phenylephrine    Infusion 0.3 MICROgram(s)/kG/Min (4.33 mL/Hr) IV Continuous <Continuous>  piperacillin/tazobactam IVPB.. 4.5 Gram(s) IV Intermittent every 6 hours  propofol Infusion 32.468 MICROgram(s)/kG/Min (15 mL/Hr) IV Continuous <Continuous>  PureFlow Dialysate RFP-401 (K 4 / Ca 3) 5000 milliLiter(s) (2500 mL/Hr) CRRT <Continuous>  sodium chloride 0.9%. 1000 milliLiter(s) (10 mL/Hr) IV Continuous <Continuous>  vancomycin  IVPB 1250 milliGRAM(s) IV Intermittent every 24 hours  vasopressin Infusion 0.05 Unit(s)/Min (3 mL/Hr) IV Continuous <Continuous>    MEDICATIONS  (PRN):  dextrose 40% Gel 15 Gram(s) Oral once PRN Blood Glucose LESS THAN 70 milliGRAM(s)/deciliter       PROBLEM LIST/ ASSESSMENT:  HEALTH ISSUES - PROBLEM Dx:      ,   Patient is a 70y old  Male who presents with a chief complaint of Thoracoabdominal aneurysm (05 Aug 2020 13:49)     s/p cardiac surgery    shock  profound multiorgan failure    will dscuus comfort measures with family  care seems futile at present         My plan includes :  close hemodynamic, ventilatory and drain monitoring and management per post op routine    Monitor for arrhythmias and monitor parameters for organ perfusion  beta blockade not administered due to hemodynamic instability and bradycardia  monitor neurologic status  Head of the bed should remain elevated to 45 deg .   chest PT and IS will be encouraged  monitor adequacy of oxygenation and ventilation and attempt to wean oxygen  antibiotic regimen will be tailored to the clinical, laboratory and microbiologic data  Nutritional goals will be met using po eventually , ensure adequate caloric intake and montior the same  Stress ulcer and VTE prophylaxis will be achieved    Glycemic control is satisfactory  Electrolytes have been repleted as necessary and wound care has been carried out. Pain control has been achieved.   agressive physical therapy and early mobility and ambulation goals will be met   The family was updated about the course and plan  CRITICAL CARE TIME personally provided by me  in evaluation and management, reassessments, review and interpretation of labs and x-rays, ventilator and hemodynamic management, formulating a plan and coordinating care: ___90____ MIN.  Time does not include procedural time. Time spent was non routine post-operarive caRE and included multiple and repeated evaluations at the bedside  CTICU ATTENDING     					    Alok Bledsoe MD

## 2020-08-06 LAB — SURGICAL PATHOLOGY STUDY: SIGNIFICANT CHANGE UP

## 2020-08-06 PROCEDURE — 93005 ELECTROCARDIOGRAM TRACING: CPT

## 2020-08-06 PROCEDURE — 71046 X-RAY EXAM CHEST 2 VIEWS: CPT

## 2020-08-06 PROCEDURE — 36415 COLL VENOUS BLD VENIPUNCTURE: CPT

## 2020-08-06 PROCEDURE — C1768: CPT

## 2020-08-06 PROCEDURE — 83735 ASSAY OF MAGNESIUM: CPT

## 2020-08-06 PROCEDURE — 93306 TTE W/DOPPLER COMPLETE: CPT

## 2020-08-06 PROCEDURE — 85025 COMPLETE CBC W/AUTO DIFF WBC: CPT

## 2020-08-06 PROCEDURE — 82533 TOTAL CORTISOL: CPT

## 2020-08-06 PROCEDURE — 33946 ECMO/ECLS INITIATION VENOUS: CPT

## 2020-08-06 PROCEDURE — 99285 EMERGENCY DEPT VISIT HI MDM: CPT | Mod: 25

## 2020-08-06 PROCEDURE — P9016: CPT

## 2020-08-06 PROCEDURE — 87389 HIV-1 AG W/HIV-1&-2 AB AG IA: CPT

## 2020-08-06 PROCEDURE — 94002 VENT MGMT INPAT INIT DAY: CPT

## 2020-08-06 PROCEDURE — 88304 TISSUE EXAM BY PATHOLOGIST: CPT

## 2020-08-06 PROCEDURE — C1874: CPT

## 2020-08-06 PROCEDURE — 82962 GLUCOSE BLOOD TEST: CPT

## 2020-08-06 PROCEDURE — C1889: CPT

## 2020-08-06 PROCEDURE — 82550 ASSAY OF CK (CPK): CPT

## 2020-08-06 PROCEDURE — 85027 COMPLETE CBC AUTOMATED: CPT

## 2020-08-06 PROCEDURE — 94150 VITAL CAPACITY TEST: CPT

## 2020-08-06 PROCEDURE — 85379 FIBRIN DEGRADATION QUANT: CPT

## 2020-08-06 PROCEDURE — 84295 ASSAY OF SERUM SODIUM: CPT

## 2020-08-06 PROCEDURE — 82803 BLOOD GASES ANY COMBINATION: CPT

## 2020-08-06 PROCEDURE — 86803 HEPATITIS C AB TEST: CPT

## 2020-08-06 PROCEDURE — C1894: CPT

## 2020-08-06 PROCEDURE — P9045: CPT

## 2020-08-06 PROCEDURE — 80048 BASIC METABOLIC PNL TOTAL CA: CPT

## 2020-08-06 PROCEDURE — 70450 CT HEAD/BRAIN W/O DYE: CPT

## 2020-08-06 PROCEDURE — 84100 ASSAY OF PHOSPHORUS: CPT

## 2020-08-06 PROCEDURE — P9047: CPT

## 2020-08-06 PROCEDURE — 88307 TISSUE EXAM BY PATHOLOGIST: CPT

## 2020-08-06 PROCEDURE — 86901 BLOOD TYPING SEROLOGIC RH(D): CPT

## 2020-08-06 PROCEDURE — 82330 ASSAY OF CALCIUM: CPT

## 2020-08-06 PROCEDURE — 93880 EXTRACRANIAL BILAT STUDY: CPT

## 2020-08-06 PROCEDURE — 86923 COMPATIBILITY TEST ELECTRIC: CPT

## 2020-08-06 PROCEDURE — 86850 RBC ANTIBODY SCREEN: CPT

## 2020-08-06 PROCEDURE — 87635 SARS-COV-2 COVID-19 AMP PRB: CPT

## 2020-08-06 PROCEDURE — 83036 HEMOGLOBIN GLYCOSYLATED A1C: CPT

## 2020-08-06 PROCEDURE — 84484 ASSAY OF TROPONIN QUANT: CPT

## 2020-08-06 PROCEDURE — 83605 ASSAY OF LACTIC ACID: CPT

## 2020-08-06 PROCEDURE — 85610 PROTHROMBIN TIME: CPT

## 2020-08-06 PROCEDURE — 33952 ECMO/ECLS INSJ PRPH CANNULA: CPT

## 2020-08-06 PROCEDURE — 36430 TRANSFUSION BLD/BLD COMPNT: CPT

## 2020-08-06 PROCEDURE — 85730 THROMBOPLASTIN TIME PARTIAL: CPT

## 2020-08-06 PROCEDURE — 82553 CREATINE MB FRACTION: CPT

## 2020-08-06 PROCEDURE — 84443 ASSAY THYROID STIM HORMONE: CPT

## 2020-08-06 PROCEDURE — 84132 ASSAY OF SERUM POTASSIUM: CPT

## 2020-08-06 PROCEDURE — 71045 X-RAY EXAM CHEST 1 VIEW: CPT

## 2020-08-06 PROCEDURE — P9017: CPT

## 2020-08-06 PROCEDURE — 85384 FIBRINOGEN ACTIVITY: CPT

## 2020-08-06 PROCEDURE — 80053 COMPREHEN METABOLIC PANEL: CPT

## 2020-08-06 PROCEDURE — P9035: CPT

## 2020-08-06 PROCEDURE — P9012: CPT

## 2020-08-06 PROCEDURE — 33948 ECMO/ECLS DAILY MGMT-VENOUS: CPT

## 2020-08-06 PROCEDURE — 72100 X-RAY EXAM L-S SPINE 2/3 VWS: CPT

## 2020-08-19 DIAGNOSIS — Z95.820 PERIPHERAL VASCULAR ANGIOPLASTY STATUS WITH IMPLANTS AND GRAFTS: ICD-10-CM

## 2020-08-19 DIAGNOSIS — E87.2 ACIDOSIS: ICD-10-CM
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
## 2020-08-19 DIAGNOSIS — M79.A3 NONTRAUMATIC COMPARTMENT SYNDROME OF ABDOMEN: ICD-10-CM

## 2020-08-19 DIAGNOSIS — D50.0 IRON DEFICIENCY ANEMIA SECONDARY TO BLOOD LOSS (CHRONIC): ICD-10-CM

## 2020-08-19 DIAGNOSIS — K55.029 ACUTE INFARCTION OF SMALL INTESTINE, EXTENT UNSPECIFIED: ICD-10-CM

## 2020-08-19 DIAGNOSIS — I71.01 DISSECTION OF THORACIC AORTA: ICD-10-CM

## 2020-08-19 DIAGNOSIS — E87.6 HYPOKALEMIA: ICD-10-CM

## 2020-08-19 DIAGNOSIS — K72.00 ACUTE AND SUBACUTE HEPATIC FAILURE WITHOUT COMA: ICD-10-CM

## 2020-08-19 DIAGNOSIS — R00.1 BRADYCARDIA, UNSPECIFIED: ICD-10-CM

## 2020-08-19 DIAGNOSIS — D69.6 THROMBOCYTOPENIA, UNSPECIFIED: ICD-10-CM

## 2020-08-19 DIAGNOSIS — N17.0 ACUTE KIDNEY FAILURE WITH TUBULAR NECROSIS: ICD-10-CM

## 2020-08-19 DIAGNOSIS — A41.9 SEPSIS, UNSPECIFIED ORGANISM: ICD-10-CM

## 2020-08-19 DIAGNOSIS — I10 ESSENTIAL (PRIMARY) HYPERTENSION: ICD-10-CM

## 2020-08-19 DIAGNOSIS — J80 ACUTE RESPIRATORY DISTRESS SYNDROME: ICD-10-CM

## 2020-08-19 DIAGNOSIS — I71.6 THORACOABDOMINAL AORTIC ANEURYSM, WITHOUT RUPTURE: ICD-10-CM

## 2020-08-19 DIAGNOSIS — Z92.3 PERSONAL HISTORY OF IRRADIATION: ICD-10-CM
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
## 2020-08-19 DIAGNOSIS — K55.049 ACUTE INFARCTION OF LARGE INTESTINE, EXTENT UNSPECIFIED: ICD-10-CM

## 2020-08-19 DIAGNOSIS — Z87.891 PERSONAL HISTORY OF NICOTINE DEPENDENCE: ICD-10-CM

## 2020-08-19 DIAGNOSIS — D68.9 COAGULATION DEFECT, UNSPECIFIED: ICD-10-CM

## 2020-08-19 DIAGNOSIS — T81.11XA POSTPROCEDURAL CARDIOGENIC SHOCK, INITIAL ENCOUNTER: ICD-10-CM

## 2020-08-19 DIAGNOSIS — I74.09 OTHER ARTERIAL EMBOLISM AND THROMBOSIS OF ABDOMINAL AORTA: ICD-10-CM

## 2020-08-19 DIAGNOSIS — Z85.46 PERSONAL HISTORY OF MALIGNANT NEOPLASM OF PROSTATE: ICD-10-CM

## 2020-08-19 DIAGNOSIS — I31.3 PERICARDIAL EFFUSION (NONINFLAMMATORY): ICD-10-CM

## 2020-08-19 DIAGNOSIS — R65.20 SEVERE SEPSIS WITHOUT SEPTIC SHOCK: ICD-10-CM

## 2023-02-01 ENCOUNTER — NON-APPOINTMENT (OUTPATIENT)
Age: 73
End: 2023-02-01